# Patient Record
Sex: FEMALE | Race: WHITE | NOT HISPANIC OR LATINO | Employment: OTHER | URBAN - METROPOLITAN AREA
[De-identification: names, ages, dates, MRNs, and addresses within clinical notes are randomized per-mention and may not be internally consistent; named-entity substitution may affect disease eponyms.]

---

## 2017-12-28 ENCOUNTER — OFFICE VISIT (OUTPATIENT)
Dept: MEDICAL GROUP | Facility: PHYSICIAN GROUP | Age: 67
End: 2017-12-28
Payer: MEDICARE

## 2017-12-28 VITALS
TEMPERATURE: 97.1 F | WEIGHT: 186 LBS | SYSTOLIC BLOOD PRESSURE: 144 MMHG | RESPIRATION RATE: 16 BRPM | DIASTOLIC BLOOD PRESSURE: 80 MMHG | OXYGEN SATURATION: 91 % | HEART RATE: 65 BPM | HEIGHT: 60 IN | BODY MASS INDEX: 36.52 KG/M2

## 2017-12-28 DIAGNOSIS — E66.9 OBESITY (BMI 35.0-39.9 WITHOUT COMORBIDITY): ICD-10-CM

## 2017-12-28 DIAGNOSIS — Z78.0 POST-MENOPAUSAL: ICD-10-CM

## 2017-12-28 DIAGNOSIS — E66.9 OBESITY (BMI 30-39.9): ICD-10-CM

## 2017-12-28 DIAGNOSIS — E11.9 TYPE 2 DIABETES MELLITUS WITHOUT COMPLICATION, WITH LONG-TERM CURRENT USE OF INSULIN (HCC): ICD-10-CM

## 2017-12-28 DIAGNOSIS — Z79.4 TYPE 2 DIABETES MELLITUS WITHOUT COMPLICATION, WITH LONG-TERM CURRENT USE OF INSULIN (HCC): ICD-10-CM

## 2017-12-28 DIAGNOSIS — Z12.11 SCREENING FOR COLON CANCER: ICD-10-CM

## 2017-12-28 DIAGNOSIS — F43.10 PTSD (POST-TRAUMATIC STRESS DISORDER): ICD-10-CM

## 2017-12-28 DIAGNOSIS — I10 ESSENTIAL HYPERTENSION: ICD-10-CM

## 2017-12-28 DIAGNOSIS — Z23 NEED FOR VACCINATION WITH 13-POLYVALENT PNEUMOCOCCAL CONJUGATE VACCINE: ICD-10-CM

## 2017-12-28 PROCEDURE — 99204 OFFICE O/P NEW MOD 45 MIN: CPT | Mod: 25 | Performed by: INTERNAL MEDICINE

## 2017-12-28 PROCEDURE — 90670 PCV13 VACCINE IM: CPT | Performed by: INTERNAL MEDICINE

## 2017-12-28 PROCEDURE — G0009 ADMIN PNEUMOCOCCAL VACCINE: HCPCS | Performed by: INTERNAL MEDICINE

## 2017-12-28 RX ORDER — LISINOPRIL 20 MG/1
20 TABLET ORAL DAILY
Qty: 90 TAB | Refills: 3 | Status: SHIPPED | OUTPATIENT
Start: 2017-12-28 | End: 2019-01-03 | Stop reason: SDUPTHER

## 2017-12-28 RX ORDER — LANCETS 30 GAUGE
EACH MISCELLANEOUS
Qty: 100 EACH | Refills: 3 | Status: SHIPPED | OUTPATIENT
Start: 2017-12-28

## 2017-12-28 RX ORDER — CITALOPRAM 20 MG/1
20 TABLET ORAL DAILY
Qty: 90 TAB | Refills: 1 | Status: SHIPPED | OUTPATIENT
Start: 2017-12-28 | End: 2018-06-29 | Stop reason: SDUPTHER

## 2017-12-28 ASSESSMENT — PATIENT HEALTH QUESTIONNAIRE - PHQ9: CLINICAL INTERPRETATION OF PHQ2 SCORE: 0

## 2017-12-28 NOTE — PATIENT INSTRUCTIONS
· Referrals have been placed for you to see an eye doctor, diabetes education class, and for a colonoscopy  · Please have your fasting blood work done at your convenience  · Please  you diabetes meter and record at least your morning blood sugars in a log to bring to your next visit   · Your blood pressure medication has been switched from metoprolol to lisinopril

## 2017-12-28 NOTE — ASSESSMENT & PLAN NOTE
Was 200 lbs but started Herbalife 2 months ago (uses 2 shakes a day a a meal replacement) and is now down to 186. Has been using SlimFast now because she doesn't have Herbalife anymore. Hasn't been eating well or exercising much.

## 2017-12-28 NOTE — PROGRESS NOTES
PRIMARY CARE CLINIC NEW PATIENT H&P  Chief Complaint   Patient presents with   • Diabetes     History of Present Illness     Diabetes mellitus  Takes levemir 45 units every night. Lost her meter and doesn't measure her blood sugars. She said that she had an A1c done a few months ago and was told it was at goal but she doesn't have a report. No known complications of her diabetes but hasn't had her eyes checked for 3 years.     HTN (hypertension)  On metoprolol 25 mg bid but has been out for almost a week. Found 2 pills yesterday of which she took 1 yesterday and 1 today.     Depression  Has been on Celexa for the past 5 years and hasn't been able to tolerate not being on it.     Obesity (BMI 35.0-39.9 without comorbidity) (HCC)  Was 200 lbs but started Herbalife 2 months ago (uses 2 shakes a day a a meal replacement) and is now down to 186. Has been using SlimFast now because she doesn't have Herbalife anymore. Hasn't been eating well or exercising much.     Current Outpatient Prescriptions   Medication Sig Dispense Refill   • insulin detemir (LEVEMIR) 100 UNIT/ML Solution Inject 45 Units as instructed every bedtime. 10 mL 1   • citalopram (CELEXA) 20 MG Tab Take 1 Tab by mouth every day. 90 Tab 1   • lisinopril (PRINIVIL) 20 MG Tab Take 1 Tab by mouth every day. 90 Tab 3   • Blood Glucose Monitoring Suppl Device Meter: Dispense Device of Insurance Preference. Sig. Use as directed for blood sugar monitoring. #1. NR. 1 Device 0   • Lancets Misc Lancets order: Lancets for insurance provided/covered meter. Sig: use daily and prn ssx high or low sugar. #100 RF x 3 100 Each 3   • Blood Glucose Monitoring Suppl (BLOOD GLUCOSE MONITOR KIT) KIT 1 Each by Does not apply route. To check blood sugars QD  Dx:  250.00  BEN:  99 1 Each 0   • aspirin (ASA) 81 MG CHEW Take 1 Tab by mouth every day. 20 Each 1     No current facility-administered medications for this visit.        Past Medical History:   Diagnosis Date   • Depression     • DIABETES MELLITUS 10/6/2011   • GERD (gastroesophageal reflux disease)    • HTN (hypertension) 3/8/2013   • Hyperlipidemia    • Shingles outbreak 2016    right groin with periodic flares     Past Surgical History:   Procedure Laterality Date   • ANGIOPLASTY     • ABDOMINAL HYSTERECTOMY TOTAL      has cervix and ovaries   • HEMORRHOIDECTOMY     • HEMORRHOIDECTOMY       Social History   Substance Use Topics   • Smoking status: Never Smoker   • Smokeless tobacco: Never Used   • Alcohol use No     Social History     Social History Narrative    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 Her  and a son and daughter of hers committed suicide.           Family History   Problem Relation Age of Onset   • Breast Cancer Mother 50     metastatic   • Alcohol/Drug Father    • Other Father      cirrhosis    • Diabetes Sister    • Other Daughter      suicide    • Other Son      suicide     Family Status   Relation Status   • Mother  at age 65   • Father    • Sister Alive   • Daughter    • Son      Allergies: Codeine    ROS  Constitutional: Negative for fatigue/generalized weakness.   HEENT: Positive for vision changes, negative for hearing changes    Respiratory: Negative for shortness of breath  Cardiovascular: Negative for chest pain, palpitations  Gastrointestinal: Negative for blood in stool, constipation, diarrhea  Genitourinary: Negative for dysuria, polyuria  Musculoskeletal: Negative for myalgias, back pain, and joint pain.   Skin: Negative for rash  Neurological: Negative for numbness, tingling  Psychiatric/Behavioral: positive for stable well managed depression      Objective   Blood pressure 144/80, pulse 65, temperature 36.2 °C (97.1 °F), resp. rate 16, height 1.524 m (5'), weight 84.4 kg (186 lb), SpO2 91 %. Body mass index is 36.33 kg/m².    General: Alert, oriented. In no acute distress   HEET: EOMI, PERRL, conjunctiva non-injected, sclera non-icteric.  Nares  patent with no significant congestion or drainage.  Elena pinnae, external auditory canals, TM pearly gray with normal light reflex bilaterally.Oral mucous membranes pink and moist with no lesions.  Neck: supple with no cervical, subclavicular lymphadenopathy, JVD, palpable thyroid nodules   Lungs: clear to auscultation bilaterally with good excursion.  CV: regular rate and rhythm.  Abdomen soft, non-distended, non-tender with normal bowel sounds. No hepatosplenomegaly, no masses palpated  Skin: no lesions. Warm, dry   Psychiatric: appropriate mood and affect     Assessment and Plan   The following treatment plan was discussed     1. Need for vaccination with 13-polyvalent pneumococcal conjugate vaccine  Given today. Will give Pneumovax in a year.   - Pneumococcal Conjugate Vaccine 13-Valent <4yo IM    2. PTSD (post-traumatic stress disorder)  Stable on her current dose of Celexa, will continue.   - citalopram (CELEXA) 20 MG Tab; Take 1 Tab by mouth every day.  Dispense: 90 Tab; Refill: 1    3. Screening for colon cancer  Due for screening colonoscopy, ordered.   - REFERRAL TO GI FOR COLONOSCOPY    4. Obesity (BMI 30-39.9)  Counseled on increasing physical activity and improving diet.   - Patient identified as having weight management issue.  Appropriate orders and counseling given.    5. Essential hypertension  Currently not well controlled and has been on metoprolol 25 mg bid but ran out a week ago. Lisinopril is a better option for her especially given her diabetes, will switch and recheck how she's doing on this at 1 month follow up appointment.   - lisinopril (PRINIVIL) 20 MG Tab; Take 1 Tab by mouth every day.  Dispense: 90 Tab; Refill: 3    6. Type 2 diabetes mellitus without complication, with long-term current use of insulin (CMS-[facility])  Will check her fasting labs as well as HgbA1c to establish her baseline currently while on levemir 45u qHS. Will send to diabetes education (has never attended before),  continue to  on weight loss. Instructed her to resume checking fasting blood sugars, diabetes meter order and supplies sent to pharmacy. Also given referral to ophthalmologist to have her vision checked, last exam was 3 years ago. Will re-assess in 1 month.   - Diabetic Monofilament LE Exam  - insulin detemir (LEVEMIR) 100 UNIT/ML Solution; Inject 45 Units as instructed every bedtime.  Dispense: 10 mL; Refill: 1  - MICROALBUMIN CREAT RATIO URINE (CLINIC COLLECT); Future  - COMP METABOLIC PANEL; Future  - HEMOGLOBIN A1C; Future  - LIPID PROFILE; Future  - REFERRAL TO DIABETIC EDUCATION Diabetes Self Management Education / Training (DSME/T) and Medical Nutrition Therapy (MNT): Initial Group DSME/MNT as authorized by payor; DSME/T Content: Monitoring Diabetes, Nutritional Management  - REFERRAL TO OPHTHALMOLOGY  - lisinopril (PRINIVIL) 20 MG Tab; Take 1 Tab by mouth every day.  Dispense: 90 Tab; Refill: 3  - Blood Glucose Monitoring Suppl Device; Meter: Dispense Device of Insurance Preference. Sig. Use as directed for blood sugar monitoring. #1. NR.  Dispense: 1 Device; Refill: 0  - Lancets Misc; Lancets order: Lancets for insurance provided/covered meter. Sig: use daily and prn ssx high or low sugar. #100 RF x 3  Dispense: 100 Each; Refill: 3    7. Post-menopausal  Due for DEXA, was told she had osteopenia before. Counseled to start over the counter vitamin D and calcium supplementation.   - DS-BONE DENSITY STUDY (DEXA)    Return in about 1 month (around 1/28/2018).    Health Maintenance      Health Maintenance Due   Topic Date Due   • Annual Wellness Visit  1950   • RETINAL SCREENING  02/20/1968   • URINE ACR / MICROALBUMIN  02/20/1968   • MAMMOGRAM  02/20/1990   • COLONOSCOPY  02/20/2000   • IMM ZOSTER VACCINE  02/20/2010   • A1C SCREENING  03/24/2014   • FASTING LIPID PROFILE  09/24/2014   • SERUM CREATININE  09/24/2014   • BONE DENSITY  02/20/2015   • IMM PNEUMOCOCCAL 65+ (ADULT) LOW/MEDIUM RISK  SERIES (1 of 2 - PCV13) 02/20/2015     Juan Gross MD  Internal Medicine  [facility]

## 2017-12-28 NOTE — ASSESSMENT & PLAN NOTE
On metoprolol 25 mg bid but has been out for almost a week. Found 2 pills yesterday of which she took 1 yesterday and 1 today.

## 2017-12-28 NOTE — ASSESSMENT & PLAN NOTE
Takes levemir 45 units every night. Lost her meter and doesn't measure her blood sugars. She said that she had an A1c done a few months ago and was told it was at goal but she doesn't have a report. No known complications of her diabetes but hasn't had her eyes checked for 3 years.

## 2017-12-29 ENCOUNTER — TELEPHONE (OUTPATIENT)
Dept: MEDICAL GROUP | Facility: PHYSICIAN GROUP | Age: 67
End: 2017-12-29

## 2017-12-29 DIAGNOSIS — E11.9 TYPE 2 DIABETES MELLITUS WITHOUT COMPLICATION, WITH LONG-TERM CURRENT USE OF INSULIN (HCC): ICD-10-CM

## 2017-12-29 DIAGNOSIS — Z79.4 TYPE 2 DIABETES MELLITUS WITHOUT COMPLICATION, WITH LONG-TERM CURRENT USE OF INSULIN (HCC): ICD-10-CM

## 2017-12-29 NOTE — TELEPHONE ENCOUNTER
"1. Caller Name: [facility]                                         Call Back Number: [phone]      Patient approves a detailed voicemail message: N\A    Pharmacy received rx for glucometer with \"use as directed\"  They need order to be more specific in order to bill medicare.  "

## 2018-01-05 ENCOUNTER — HOSPITAL ENCOUNTER (OUTPATIENT)
Dept: LAB | Facility: MEDICAL CENTER | Age: 68
End: 2018-01-05
Attending: INTERNAL MEDICINE
Payer: MEDICARE

## 2018-01-05 DIAGNOSIS — E11.9 TYPE 2 DIABETES MELLITUS WITHOUT COMPLICATION, WITH LONG-TERM CURRENT USE OF INSULIN (HCC): ICD-10-CM

## 2018-01-05 DIAGNOSIS — E78.5 HYPERLIPIDEMIA, UNSPECIFIED HYPERLIPIDEMIA TYPE: ICD-10-CM

## 2018-01-05 DIAGNOSIS — Z79.4 TYPE 2 DIABETES MELLITUS WITHOUT COMPLICATION, WITH LONG-TERM CURRENT USE OF INSULIN (HCC): ICD-10-CM

## 2018-01-05 LAB
ALBUMIN SERPL BCP-MCNC: 3.8 G/DL (ref 3.2–4.9)
ALBUMIN/GLOB SERPL: 1 G/DL
ALP SERPL-CCNC: 76 U/L (ref 30–99)
ALT SERPL-CCNC: 16 U/L (ref 2–50)
ANION GAP SERPL CALC-SCNC: 8 MMOL/L (ref 0–11.9)
AST SERPL-CCNC: 15 U/L (ref 12–45)
BILIRUB SERPL-MCNC: 0.4 MG/DL (ref 0.1–1.5)
BUN SERPL-MCNC: 15 MG/DL (ref 8–22)
CALCIUM SERPL-MCNC: 9.4 MG/DL (ref 8.5–10.5)
CHLORIDE SERPL-SCNC: 104 MMOL/L (ref 96–112)
CHOLEST SERPL-MCNC: 263 MG/DL (ref 100–199)
CO2 SERPL-SCNC: 27 MMOL/L (ref 20–33)
CREAT SERPL-MCNC: 0.64 MG/DL (ref 0.5–1.4)
CREAT UR-MCNC: 98.7 MG/DL
EST. AVERAGE GLUCOSE BLD GHB EST-MCNC: 160 MG/DL
GFR SERPL CREATININE-BSD FRML MDRD: >60 ML/MIN/1.73 M 2
GLOBULIN SER CALC-MCNC: 3.8 G/DL (ref 1.9–3.5)
GLUCOSE SERPL-MCNC: 142 MG/DL (ref 65–99)
HBA1C MFR BLD: 7.2 % (ref 0–5.6)
HDLC SERPL-MCNC: 50 MG/DL
LDLC SERPL CALC-MCNC: 185 MG/DL
MICROALBUMIN UR-MCNC: <0.7 MG/DL
MICROALBUMIN/CREAT UR: NORMAL MG/G (ref 0–30)
POTASSIUM SERPL-SCNC: 4.2 MMOL/L (ref 3.6–5.5)
PROT SERPL-MCNC: 7.6 G/DL (ref 6–8.2)
SODIUM SERPL-SCNC: 139 MMOL/L (ref 135–145)
TRIGL SERPL-MCNC: 139 MG/DL (ref 0–149)

## 2018-01-05 PROCEDURE — 80053 COMPREHEN METABOLIC PANEL: CPT

## 2018-01-05 PROCEDURE — 36415 COLL VENOUS BLD VENIPUNCTURE: CPT

## 2018-01-05 PROCEDURE — 83036 HEMOGLOBIN GLYCOSYLATED A1C: CPT

## 2018-01-05 PROCEDURE — 80061 LIPID PANEL: CPT

## 2018-01-05 PROCEDURE — 82570 ASSAY OF URINE CREATININE: CPT

## 2018-01-05 PROCEDURE — 82043 UR ALBUMIN QUANTITATIVE: CPT

## 2018-01-05 RX ORDER — ATORVASTATIN CALCIUM 40 MG/1
40 TABLET, FILM COATED ORAL DAILY
Qty: 90 TAB | Refills: 3 | Status: SHIPPED | OUTPATIENT
Start: 2018-01-05 | End: 2018-03-28 | Stop reason: SINTOL

## 2018-01-11 ENCOUNTER — OFFICE VISIT (OUTPATIENT)
Dept: MEDICAL GROUP | Facility: PHYSICIAN GROUP | Age: 68
End: 2018-01-11
Payer: MEDICARE

## 2018-01-11 VITALS
TEMPERATURE: 98.6 F | HEIGHT: 60 IN | OXYGEN SATURATION: 93 % | DIASTOLIC BLOOD PRESSURE: 60 MMHG | SYSTOLIC BLOOD PRESSURE: 104 MMHG | WEIGHT: 186 LBS | RESPIRATION RATE: 16 BRPM | BODY MASS INDEX: 36.52 KG/M2 | HEART RATE: 67 BPM

## 2018-01-11 DIAGNOSIS — I10 ESSENTIAL HYPERTENSION: ICD-10-CM

## 2018-01-11 DIAGNOSIS — Z79.4 TYPE 2 DIABETES MELLITUS WITHOUT COMPLICATION, WITH LONG-TERM CURRENT USE OF INSULIN (HCC): ICD-10-CM

## 2018-01-11 DIAGNOSIS — E11.9 TYPE 2 DIABETES MELLITUS WITHOUT COMPLICATION, WITH LONG-TERM CURRENT USE OF INSULIN (HCC): ICD-10-CM

## 2018-01-11 DIAGNOSIS — R05.9 COUGH: ICD-10-CM

## 2018-01-11 LAB
FLUAV+FLUBV AG SPEC QL IA: NEGATIVE
INT CON NEG: NEGATIVE
INT CON POS: POSITIVE

## 2018-01-11 PROCEDURE — 99213 OFFICE O/P EST LOW 20 MIN: CPT | Performed by: INTERNAL MEDICINE

## 2018-01-11 PROCEDURE — 87804 INFLUENZA ASSAY W/OPTIC: CPT | Performed by: INTERNAL MEDICINE

## 2018-01-11 NOTE — ASSESSMENT & PLAN NOTE
She just now got her monitor in. Is having issues with covering the cost of insulin, which has been $100 a month.

## 2018-01-11 NOTE — ASSESSMENT & PLAN NOTE
Has been having a cough and sore throat for the past week. Also having post nasal drip. Feeling fatigue and having a lot of post nasal drip. Denies fevers, chills, nausea, vomiting. Having muscle aches and headaches. Drinking lots of water but not eating much. Has sick contacts at home.

## 2018-01-11 NOTE — PROGRESS NOTES
PRIMARY CARE CLINIC FOLLOW UP VISIT  Chief Complaint   Patient presents with   • Cough   • Medication Management     insulin      History of Present Illness     Cough  Has been having a cough and sore throat for the past week. Also having post nasal drip. Feeling fatigue and having a lot of post nasal drip. Denies fevers, chills, nausea, vomiting. Having muscle aches and headaches. Drinking lots of water but not eating much. Has sick contacts at home.     Diabetes mellitus (CMS-HCC)  She just now got her monitor in. Is having issues with covering the cost of insulin, which has been $100 a month.     HTN (hypertension)  Was switched from metoprolol to lisinopril at her last visit with me.     Current Outpatient Prescriptions   Medication Sig Dispense Refill   • atorvastatin (LIPITOR) 40 MG Tab Take 1 Tab by mouth every day. 90 Tab 3   • Blood Glucose Monitoring Suppl Device Meter: Dispense Device of Insurance Preference. Sig. Use every morning to obtain fasting blood sugars. #1. NR. 1 Device 0   • insulin detemir (LEVEMIR) 100 UNIT/ML Solution Inject 45 Units as instructed every bedtime. 10 mL 1   • citalopram (CELEXA) 20 MG Tab Take 1 Tab by mouth every day. 90 Tab 1   • lisinopril (PRINIVIL) 20 MG Tab Take 1 Tab by mouth every day. 90 Tab 3   • Blood Glucose Monitoring Suppl Device Meter: Dispense Device of Insurance Preference. Sig. Use as directed for blood sugar monitoring. #1. NR. 1 Device 0   • Lancets Misc Lancets order: Lancets for insurance provided/covered meter. Sig: use daily and prn ssx high or low sugar. #100 RF x 3 100 Each 3   • Blood Glucose Monitoring Suppl (BLOOD GLUCOSE MONITOR KIT) KIT 1 Each by Does not apply route. To check blood sugars QD  Dx:  250.00  BEN:  99 1 Each 0   • aspirin (ASA) 81 MG CHEW Take 1 Tab by mouth every day. 20 Each 1     No current facility-administered medications for this visit.      Past Medical History:   Diagnosis Date   • Depression    • DIABETES MELLITUS 10/6/2011    • GERD (gastroesophageal reflux disease)    • HTN (hypertension) 3/8/2013   • Hyperlipidemia    • Shingles outbreak 2016    right groin with periodic flares     Past Surgical History:   Procedure Laterality Date   • ANGIOPLASTY     • ABDOMINAL HYSTERECTOMY TOTAL      has cervix and ovaries   • HEMORRHOIDECTOMY     • HEMORRHOIDECTOMY       Social History   Substance Use Topics   • Smoking status: Never Smoker   • Smokeless tobacco: Never Used   • Alcohol use No     Social History     Social History Narrative    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 Her  and a son and daughter of hers committed suicide.           Family History   Problem Relation Age of Onset   • Breast Cancer Mother 50     metastatic   • Alcohol/Drug Father    • Other Father      cirrhosis    • Diabetes Sister    • Other Daughter      suicide    • Other Son      suicide     Family Status   Relation Status   • Mother  at age 65   • Father    • Sister Alive   • Daughter    • Son      Allergies: Codeine    ROS  As per HPI above. All other systems reviewed and negative.        Objective   Blood pressure 104/60, pulse 67, temperature 37 °C (98.6 °F), resp. rate 16, height 1.524 m (5'), weight 84.4 kg (186 lb), SpO2 93 %. Body mass index is 36.33 kg/m².    General: alert and oriented, pleasant, cooperative  HEENT: Normocephalic, atraumatic. No thyroid masses. Oropharynx clear without exudate or injection.   Cardiovascular: regular rate and rhythm, normal S1/S2  Pulmonary: lungs clear to auscultation bilaterally  Lymphatics: no cervical or supraclavicular lymphadenopathy   Skin: warm and dry, no lesions or rashes  Psychiatric: appropriate mood and affect. Good insight and appropriate judgment     Assessment and Plan   The following treatment plan was discussed     1. Cough  Most likely viral URI, POCT flu negative. Counseled on hydration, over the counter nasal steroid spray to help with post nasal  drip, and mucinex.     2. Type 2 diabetes mellitus without complication, with long-term current use of insulin (CMS-[facility])  Her most recent hemoglobin A1c was at goal on levemir 45 units nightly but she's having issues with cost coverage for this. Referred to complex care management for help and also contacted Amna Mckenzie from [facility] to assist with alternatives.   - REFERRAL TO COMPLEX CARE MANAGEMENT Services Requested: Care Manager to Evaluate and Recommend    3. Essential hypertension  Currently at goal, blood pressure today 106/40 on lisinopril 20 mg daily. This was switched from metoprolol when she established care with me last month. Continue lisinopril 20 mg daily.       Healthcare maintenance     Health Maintenance Due   Topic Date Due   • Annual Wellness Visit  1950   • RETINAL SCREENING  02/20/1968   • COLONOSCOPY  02/20/2000   • IMM ZOSTER VACCINE  02/20/2010   • BONE DENSITY  02/20/2015       Return in about 1 month (around 2/11/2018) for AWV.    Juan Gross MD  Internal Medicine  [facility]

## 2018-01-15 ENCOUNTER — TELEPHONE (OUTPATIENT)
Dept: MEDICAL GROUP | Facility: PHYSICIAN GROUP | Age: 68
End: 2018-01-15

## 2018-01-15 DIAGNOSIS — E11.9 TYPE 2 DIABETES MELLITUS WITHOUT COMPLICATION, WITH LONG-TERM CURRENT USE OF INSULIN (HCC): ICD-10-CM

## 2018-01-15 DIAGNOSIS — Z79.4 TYPE 2 DIABETES MELLITUS WITHOUT COMPLICATION, WITH LONG-TERM CURRENT USE OF INSULIN (HCC): ICD-10-CM

## 2018-01-15 RX ORDER — INSULIN GLARGINE 100 [IU]/ML
45 INJECTION, SOLUTION SUBCUTANEOUS
Qty: 10 ML | Refills: 3 | Status: SHIPPED | OUTPATIENT
Start: 2018-01-15 | End: 2018-05-09 | Stop reason: SDUPTHER

## 2018-01-15 NOTE — TELEPHONE ENCOUNTER
Please call patient and let her know that her insulin detemir is not covered by [facility] but lantus is. Her new prescription has been sent to her pharmacy.

## 2018-01-19 ENCOUNTER — TELEPHONE (OUTPATIENT)
Dept: MEDICAL GROUP | Facility: PHYSICIAN GROUP | Age: 68
End: 2018-01-19

## 2018-01-19 DIAGNOSIS — E11.9 TYPE 2 DIABETES MELLITUS WITHOUT COMPLICATION, WITH LONG-TERM CURRENT USE OF INSULIN (HCC): ICD-10-CM

## 2018-01-19 DIAGNOSIS — Z79.4 TYPE 2 DIABETES MELLITUS WITHOUT COMPLICATION, WITH LONG-TERM CURRENT USE OF INSULIN (HCC): ICD-10-CM

## 2018-01-22 DIAGNOSIS — E11.9 TYPE 2 DIABETES MELLITUS WITHOUT COMPLICATION, WITH LONG-TERM CURRENT USE OF INSULIN (HCC): ICD-10-CM

## 2018-01-22 DIAGNOSIS — Z79.4 TYPE 2 DIABETES MELLITUS WITHOUT COMPLICATION, WITH LONG-TERM CURRENT USE OF INSULIN (HCC): ICD-10-CM

## 2018-01-30 ENCOUNTER — PATIENT OUTREACH (OUTPATIENT)
Dept: HEALTH INFORMATION MANAGEMENT | Facility: OTHER | Age: 68
End: 2018-01-30

## 2018-01-30 NOTE — PROGRESS NOTES
Referral received from pt's provider Dr. Gross indicating pt may benefit from some resources to assist in insulin and diabetic supply cost. Initial outreach call scheduled on this date. Outbound call to pt to discuss above referral. No answer, LVM with contact information requesting TC back.     Plan:  Will attempt to reach pt for 2nd attempt on 02/01/2018 at 09:00am, unless LSW hears from pt sooner.

## 2018-02-01 ENCOUNTER — PATIENT OUTREACH (OUTPATIENT)
Dept: HEALTH INFORMATION MANAGEMENT | Facility: OTHER | Age: 68
End: 2018-02-01

## 2018-02-01 ENCOUNTER — PATIENT MESSAGE (OUTPATIENT)
Dept: HEALTH INFORMATION MANAGEMENT | Facility: OTHER | Age: 68
End: 2018-02-01

## 2018-02-01 NOTE — PROGRESS NOTES
Initial outreach 2nd attempt scheduled on this date to discuss referral received by pt's provider Dr. Gross. Referral indicates pt may be having a difficult time affording her insulin. Outbound call to pt to discuss referral and pt's identified needs. No answer, LVM with contact information requesting TC back.     Plan:  LSW to send pt [employer] message with contact information and close referral, due to not being able to reach pt.

## 2018-02-07 ENCOUNTER — APPOINTMENT (OUTPATIENT)
Dept: RADIOLOGY | Facility: MEDICAL CENTER | Age: 68
End: 2018-02-07
Attending: INTERNAL MEDICINE
Payer: MEDICARE

## 2018-03-27 ENCOUNTER — TELEPHONE (OUTPATIENT)
Dept: MEDICAL GROUP | Facility: PHYSICIAN GROUP | Age: 68
End: 2018-03-27

## 2018-03-27 NOTE — TELEPHONE ENCOUNTER
ANNUAL WELLNESS VISIT PRE-VISIT PLANNING WITHOUT OUTREACH    1.  Reviewed note from last office visit with PCP: YES    2.  If any orders were placed at last visit, do we have Results/Consult Notes?        •  Labs - Labs ordered, completed on 1/11/18 and results are in chart.  Note: If patient appointment is for lab review and patient did not complete labs, check with provider if OK to reschedule patient until labs completed.       •  Imaging - Imaging was not ordered at last office visit.       •  Referrals - Referral ordered, patient has NOT been seen.    3.  Immunizations were updated in [employer] using Web[employer]?: Yes       •  WebIZ Recommendations: ZOSTAVAX (Shingles)        •  Is patient due for Tdap? NO       •  Is patient due for Shingles? YES. Patient was notified of copay/out of pocket cost.     4.  Patient is due for the following Health Maintenance Topics:   Health Maintenance Due   Topic Date Due   • Annual Wellness Visit  1950   • RETINAL SCREENING  02/20/1968   • COLONOSCOPY  02/20/2000   • IMM ZOSTER VACCINE  02/20/2010   • BONE DENSITY  02/20/2015       - Patient is up-to-date on all Health Maintenance topics. No records have been requested at this time.    5.  Reviewed/Updated the following with patient:       •   Preferred Pharmacy? YES       •   Preferred Lab? YES       •   Preferred Communication? YES       •   Allergies? YES       •   Medications? NO       •   Social History? NO       •   Family History (document living status of immediate family members and if + hx of  cancer, diabetes, hypertension, hyperlipidemia, heart attack, stroke) NO    6.  Care Team Updated:       •   DME Company (gait device, O2, CPAP, etc.): YES       •   Other Specialists (eye doctor, derm, GYN, cardiology, endo, etc): YES    7.  Patient has the following Care Path diagnoses on Problem List:  NONE    8.  MDX printed and highlighted for Provider? YES    9.  Patient was advised: “This is a free wellness visit. The  provider will screen for medical conditions to help you stay healthy. If you have other concerns to address you may be asked to discuss these at a separate visit or there may be an additional fee.”     10.  Patient was NOT informed to arrive 15 min prior to their scheduled appointment and bring in their medication bottles.

## 2018-03-28 ENCOUNTER — HOSPITAL ENCOUNTER (OUTPATIENT)
Dept: LAB | Facility: MEDICAL CENTER | Age: 68
End: 2018-03-28
Attending: INTERNAL MEDICINE
Payer: MEDICARE

## 2018-03-28 ENCOUNTER — OFFICE VISIT (OUTPATIENT)
Dept: MEDICAL GROUP | Facility: PHYSICIAN GROUP | Age: 68
End: 2018-03-28
Payer: MEDICARE

## 2018-03-28 VITALS
OXYGEN SATURATION: 92 % | DIASTOLIC BLOOD PRESSURE: 66 MMHG | RESPIRATION RATE: 16 BRPM | TEMPERATURE: 98.4 F | WEIGHT: 189 LBS | HEART RATE: 76 BPM | BODY MASS INDEX: 37.11 KG/M2 | SYSTOLIC BLOOD PRESSURE: 138 MMHG | HEIGHT: 60 IN

## 2018-03-28 DIAGNOSIS — Z79.4 TYPE 2 DIABETES MELLITUS WITHOUT COMPLICATION, WITH LONG-TERM CURRENT USE OF INSULIN (HCC): ICD-10-CM

## 2018-03-28 DIAGNOSIS — R21 RASH: ICD-10-CM

## 2018-03-28 DIAGNOSIS — L60.9 FINGERNAIL ABNORMALITIES: ICD-10-CM

## 2018-03-28 DIAGNOSIS — Z77.012 EXPOSURE TO URANIUM: ICD-10-CM

## 2018-03-28 DIAGNOSIS — E11.9 TYPE 2 DIABETES MELLITUS WITHOUT COMPLICATION, WITH LONG-TERM CURRENT USE OF INSULIN (HCC): ICD-10-CM

## 2018-03-28 LAB — TSH SERPL DL<=0.005 MIU/L-ACNC: 1.93 UIU/ML (ref 0.38–5.33)

## 2018-03-28 PROCEDURE — 84443 ASSAY THYROID STIM HORMONE: CPT

## 2018-03-28 PROCEDURE — 36415 COLL VENOUS BLD VENIPUNCTURE: CPT

## 2018-03-28 PROCEDURE — 99213 OFFICE O/P EST LOW 20 MIN: CPT | Performed by: INTERNAL MEDICINE

## 2018-03-28 PROCEDURE — 92250 FUNDUS PHOTOGRAPHY W/I&R: CPT | Mod: TC | Performed by: INTERNAL MEDICINE

## 2018-03-28 RX ORDER — BLOOD-GLUCOSE METER
KIT MISCELLANEOUS
COMMUNITY
Start: 2018-01-23

## 2018-03-28 RX ORDER — PRAVASTATIN SODIUM 20 MG
20 TABLET ORAL DAILY
Qty: 90 TAB | Refills: 1 | Status: SHIPPED | OUTPATIENT
Start: 2018-03-28 | End: 2019-02-08 | Stop reason: SDUPTHER

## 2018-03-28 ASSESSMENT — PAIN SCALES - GENERAL: PAINLEVEL: NO PAIN

## 2018-03-28 NOTE — ASSESSMENT & PLAN NOTE
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

## 2018-03-28 NOTE — PROGRESS NOTES
PRIMARY CARE CLINIC FOLLOW UP VISIT  Chief Complaint   Patient presents with   • Medication Reaction     Atorvastatin; x 5 weeks ago;      History of Present Illness     Rash  Started having redness of both hands since starting atorvastatin 40 mg about 5 weeks ago. She has tried eczema cream without relief. She had then stopped atorvastatin which helped resolve her symptoms but then the rash recurred when she resumed atorvastatin.     Diabetes mellitus (CMS-HCC)  Was due for eye exam but her doctor went out of town and they had to reschedule.     Fingernail abnormalities  Had 20 lbs fall onto her right toe that is abnormal since then. Her toenail has thickened and is difficult for her to trim.     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       Current Outpatient Prescriptions   Medication Sig Dispense Refill   • pravastatin (PRAVACHOL) 20 MG Tab Take 1 Tab by mouth every day. 90 Tab 1   • Blood Glucose Monitoring Suppl Supplies Misc Test strip and meter order: Test strips for [employer] or whichever brand is covered by ins. Sig: use daily and prn ssx high or low sugar 100 Units 3   • insulin glargine (LANTUS) 100 UNIT/ML Solution Inject 45 Units as instructed every bedtime. 10 mL 3   • lisinopril (PRINIVIL) 20 MG Tab Take 1 Tab by mouth every day. 90 Tab 3   • FREESTYLE LITE strip      • Blood Glucose Monitoring Suppl Device Meter: Dispense Device of Insurance Preference. Sig. Use every morning to obtain fasting blood sugars. #1. NR. 1 Device 0   • citalopram (CELEXA) 20 MG Tab Take 1 Tab by mouth every day. 90 Tab 1   • Blood Glucose Monitoring Suppl Device Meter: Dispense Device of Insurance Preference. Sig. Use as directed for blood sugar monitoring. #1. NR. 1 Device 0   • Lancets Misc Lancets order: Lancets for insurance provided/covered meter. Sig: use daily and prn ssx high or low sugar. #100 RF x 3 100 Each 3   • Blood Glucose  Monitoring Suppl (BLOOD GLUCOSE MONITOR KIT) KIT 1 Each by Does not apply route. To check blood sugars QD  Dx:  250.00  BEN:  99 1 Each 0   • aspirin (ASA) 81 MG CHEW Take 1 Tab by mouth every day. 20 Each 1     No current facility-administered medications for this visit.      Past Medical History:   Diagnosis Date   • Depression    • DIABETES MELLITUS 10/6/2011   • GERD (gastroesophageal reflux disease)    • HTN (hypertension) 3/8/2013   • Hyperlipidemia    • Shingles outbreak 2016    right groin with periodic flares     Past Surgical History:   Procedure Laterality Date   • ANGIOPLASTY     • ABDOMINAL HYSTERECTOMY TOTAL      has cervix and ovaries   • HEMORRHOIDECTOMY     • HEMORRHOIDECTOMY       Social History   Substance Use Topics   • Smoking status: Never Smoker   • Smokeless tobacco: Never Used   • Alcohol use No     Social History     Social History Narrative    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 Her  and a son and daughter of hers committed suicide.           Family History   Problem Relation Age of Onset   • Breast Cancer Mother 50     metastatic   • Alcohol/Drug Father    • Other Father      cirrhosis    • Diabetes Sister    • Other Daughter      suicide    • Other Son      suicide     Family Status   Relation Status   • Mother  at age 65   • Father    • Sister Alive   • Daughter    • Son      Allergies: Atorvastatin and Codeine    ROS  As per HPI above. All other systems reviewed and negative.        Objective   Blood pressure 138/66, pulse 76, temperature 36.9 °C (98.4 °F), resp. rate 16, height 1.524 m (5'), weight 85.7 kg (189 lb), SpO2 92 %, not currently breastfeeding. Body mass index is 36.91 kg/m².    General: alert and oriented, pleasant, cooperative  HEENT: Normocephalic, atraumatic.   Lymphatics: no cervical or supraclavicular lymphadenopathy   Skin: erythema of dorsal aspects of bilateral hands with diffuse excoriations.  Thickness/gray color changes of right great toenail   Psychiatric: appropriate mood and affect. Good insight and appropriate judgment     Assessment and Plan   The following treatment plan was discussed     1. Rash  Will stop atorvastatin 40 mg daily and will switch to pravastatin 20 mg daily. Told her that she may wait 2-3 weeks before starting the pravastatin.   - pravastatin (PRAVACHOL) 20 MG Tab; Take 1 Tab by mouth every day.  Dispense: 90 Tab; Refill: 1    2. Fingernail abnormalities  Recommended vinegar/water soaks. If no improvement will refer to podiatry.     3. Type 2 diabetes mellitus without complication, with long-term current use of insulin (CMS-[facility])  POCT retinal eye exam in office today.   - POCT Retinal Eye Exam    4. Exposure to uranium  - TSH WITH REFLEX TO FT4; Future      Healthcare maintenance     Health Maintenance Due   Topic Date Due   • Annual Wellness Visit  1950   • RETINAL SCREENING  02/20/1968   • COLONOSCOPY  02/20/2000   • IMM ZOSTER VACCINE  02/20/2010   • BONE DENSITY  02/20/2015       Return in about 9 days (around 4/6/2018) for annual wellness visit .    Juan Gross MD  Internal Medicine  [facility]

## 2018-03-28 NOTE — ASSESSMENT & PLAN NOTE
Started having redness of both hands since starting atorvastatin 40 mg about 5 weeks ago. She has tried eczema cream without relief. She had then stopped atorvastatin which helped resolve her symptoms but then the rash recurred when she resumed atorvastatin.

## 2018-03-28 NOTE — ASSESSMENT & PLAN NOTE
Had 20 lbs fall onto her right toe that is abnormal since then. Her toenail has thickened and is difficult for her to trim.

## 2018-03-28 NOTE — PATIENT INSTRUCTIONS
· Please provide us with records of your most recent colonoscopy   · Try vinegar soaks for your right toenail  · Please check that it is insulin lantus that you are taking every night

## 2018-04-10 LAB — RETINAL SCREEN: NEGATIVE

## 2018-04-11 ENCOUNTER — TELEPHONE (OUTPATIENT)
Dept: MEDICAL GROUP | Facility: PHYSICIAN GROUP | Age: 68
End: 2018-04-11

## 2018-04-11 DIAGNOSIS — L60.9 FINGERNAIL ABNORMALITIES: ICD-10-CM

## 2018-04-11 NOTE — TELEPHONE ENCOUNTER
Pt was seen 03/28/18 because a 20 lb weight had fallen on her toe and toenail has become thicker.  She states she was told to soak in vinegar but has not helped.  She is diabetic and has been told not to trim her toenails herself.  She is asking to see a podiatrist to toenails trimmed.

## 2018-04-11 NOTE — TELEPHONE ENCOUNTER
1. Caller Name: Diamante                                       Call Back Number: [phone] (home)         Patient approves a detailed voicemail message: N\A    2. SPECIFIC Action To Be Taken: Referral pending, please sign.  Not sure what the diagnosis would be    3. Diagnosis/Clinical Reason for Request: unknown    4. Specialty & Provider Name/Lab/Imaging Location: podiatry    5. Is appointment scheduled for requested order/referral: no    Patient informed they will receive a return phone call from the office ONLY if there are any questions before processing their request. Advised to call back if they haven't received a call from the referral department in 5 days.

## 2018-05-09 DIAGNOSIS — Z79.4 TYPE 2 DIABETES MELLITUS WITHOUT COMPLICATION, WITH LONG-TERM CURRENT USE OF INSULIN (HCC): ICD-10-CM

## 2018-05-09 DIAGNOSIS — E11.9 TYPE 2 DIABETES MELLITUS WITHOUT COMPLICATION, WITH LONG-TERM CURRENT USE OF INSULIN (HCC): ICD-10-CM

## 2018-05-09 RX ORDER — INSULIN GLARGINE 100 [IU]/ML
INJECTION, SOLUTION SUBCUTANEOUS
Qty: 40 ML | Refills: 0 | Status: SHIPPED | OUTPATIENT
Start: 2018-05-09 | End: 2018-08-02 | Stop reason: SDUPTHER

## 2018-06-29 DIAGNOSIS — F43.10 PTSD (POST-TRAUMATIC STRESS DISORDER): ICD-10-CM

## 2018-07-02 RX ORDER — CITALOPRAM 20 MG/1
TABLET ORAL
Qty: 90 TAB | Refills: 1 | Status: SHIPPED | OUTPATIENT
Start: 2018-07-02 | End: 2019-01-10 | Stop reason: SDUPTHER

## 2018-07-25 ENCOUNTER — PATIENT OUTREACH (OUTPATIENT)
Dept: HEALTH INFORMATION MANAGEMENT | Facility: OTHER | Age: 68
End: 2018-07-25

## 2018-07-25 NOTE — PROGRESS NOTES
Outcome: Left Message    Please transfer to Patient Outreach Team at [phone] when patient returns call.    Attempt # 1  Pre visit planning

## 2018-07-30 ENCOUNTER — TELEPHONE (OUTPATIENT)
Dept: MEDICAL GROUP | Facility: PHYSICIAN GROUP | Age: 68
End: 2018-07-30

## 2018-07-30 NOTE — TELEPHONE ENCOUNTER
Future Appointments       Provider Department Center    8/8/2018 8:35 AM Juan Gross M.D. [facility]    8/9/2018 2:15 PM Juan Gross M.D.; [facility]  [facility]        ANNUAL WELLNESS VISIT PRE-VISIT PLANNING WITHOUT OUTREACH    1.  Reviewed note from last office visit with PCP: YES    2.  If any orders were placed at last visit, do we have Results/Consult Notes?        •  Labs - Labs ordered, completed on 03/28/18 and results are in chart.       •  Imaging - Imaging was not ordered at last office visit.       •  Referrals - Referral ordered, patient has NOT been seen.    3.  Immunizations were updated in [employer] using WebIZ?: Yes       •  WebIZ Recommendations: FLU, TD and SHINGRIX (Shingles)        •  Is patient due for Tdap? NO       •  Is patient due for Shingles? YES. Patient was not notified of copay/out of pocket cost.     4.  Patient is due for the following Health Maintenance Topics:   Health Maintenance Due   Topic Date Due   • Annual Wellness Visit  1950   • COLONOSCOPY  02/20/2000   • IMM ZOSTER VACCINES (1 of 2) 02/20/2000   • BONE DENSITY  02/20/2015   • A1C SCREENING  07/05/2018       5.  Reviewed/Updated the following with patient:       •   Preferred Pharmacy? NO       •   Preferred Lab? NO       •   Preferred Communication? NO       •   Allergies? NO       •   Medications? NO       •   Social History? NO       •   Family History (document living status of immediate family members and if + hx of  cancer, diabetes, hypertension, hyperlipidemia, heart attack, stroke) NO    6.  Care Team Updated:       •   DME Company (gait device, O2, CPAP, etc.): NO       •   Other Specialists (eye doctor, derm, GYN, cardiology, endo, etc): NO    7.  Patient has the following Care Path diagnoses on Problem List:  DIABETES  DEPRESSION       8.  MDX printed and highlighted for Provider? NO  Complete and complaint on 03/28/18    9.  Patient was  not advised: “This is a free wellness visit. The provider will screen for medical conditions to help you stay healthy. If you have other concerns to address you may be asked to discuss these at a separate visit or there may be an additional fee.”     10.  Patient was informed to arrive 15 min prior to their scheduled appointment and bring in their medication bottles.LVMTCB after three attempts I was unable to get in contact with Pt.

## 2018-08-02 DIAGNOSIS — Z79.4 TYPE 2 DIABETES MELLITUS WITHOUT COMPLICATION, WITH LONG-TERM CURRENT USE OF INSULIN (HCC): ICD-10-CM

## 2018-08-02 DIAGNOSIS — E11.9 TYPE 2 DIABETES MELLITUS WITHOUT COMPLICATION, WITH LONG-TERM CURRENT USE OF INSULIN (HCC): ICD-10-CM

## 2018-08-03 NOTE — TELEPHONE ENCOUNTER
Was the patient seen in the last year in this department? Yes    Does patient have an active prescription for medications requested? No     Received Request Via: Pharmacy      Pt met protocol?: Yes    LAST OV 03/28/2018      Lab Results  Component Value Date/Time   HBA1C 7.2 (H) 01/05/2018 0742       Lab Results  Component Value Date/Time   AVGLUC 160 01/05/2018 0742       Lab Results  Component Value Date/Time   CHOLSTRLTOT 263 (H) 01/05/2018 0742       Lab Results  Component Value Date/Time   TRIGLYCERIDE 139 01/05/2018 0742       Lab Results  Component Value Date/Time   HDL 50 01/05/2018 0742       Lab Results  Component Value Date/Time    (H) 01/05/2018 0742

## 2018-08-04 RX ORDER — INSULIN GLARGINE 100 [IU]/ML
INJECTION, SOLUTION SUBCUTANEOUS
Qty: 40 ML | Refills: 0 | Status: SHIPPED | OUTPATIENT
Start: 2018-08-04 | End: 2018-11-05 | Stop reason: SDUPTHER

## 2018-12-06 DIAGNOSIS — E11.9 TYPE 2 DIABETES MELLITUS WITHOUT COMPLICATION, WITH LONG-TERM CURRENT USE OF INSULIN (HCC): ICD-10-CM

## 2018-12-06 DIAGNOSIS — Z79.4 TYPE 2 DIABETES MELLITUS WITHOUT COMPLICATION, WITH LONG-TERM CURRENT USE OF INSULIN (HCC): ICD-10-CM

## 2018-12-07 RX ORDER — INSULIN GLARGINE 100 [IU]/ML
INJECTION, SOLUTION SUBCUTANEOUS
Qty: 40 ML | Refills: 0 | Status: SHIPPED | OUTPATIENT
Start: 2018-12-07 | End: 2019-01-10 | Stop reason: SDUPTHER

## 2018-12-08 NOTE — TELEPHONE ENCOUNTER
Last seen by PCP 3/18. Will send 3 months to pharmacy. Patient is due for an appointment. Please schedule.

## 2019-01-02 DIAGNOSIS — I10 ESSENTIAL HYPERTENSION: ICD-10-CM

## 2019-01-02 DIAGNOSIS — Z79.4 TYPE 2 DIABETES MELLITUS WITHOUT COMPLICATION, WITH LONG-TERM CURRENT USE OF INSULIN (HCC): ICD-10-CM

## 2019-01-02 DIAGNOSIS — E11.9 TYPE 2 DIABETES MELLITUS WITHOUT COMPLICATION, WITH LONG-TERM CURRENT USE OF INSULIN (HCC): ICD-10-CM

## 2019-01-03 DIAGNOSIS — E11.9 TYPE 2 DIABETES MELLITUS WITHOUT COMPLICATION, WITH LONG-TERM CURRENT USE OF INSULIN (HCC): ICD-10-CM

## 2019-01-03 DIAGNOSIS — I10 ESSENTIAL HYPERTENSION: ICD-10-CM

## 2019-01-03 DIAGNOSIS — Z79.4 TYPE 2 DIABETES MELLITUS WITHOUT COMPLICATION, WITH LONG-TERM CURRENT USE OF INSULIN (HCC): ICD-10-CM

## 2019-01-04 RX ORDER — LISINOPRIL 20 MG/1
20 TABLET ORAL DAILY
Qty: 90 TAB | Refills: 0 | OUTPATIENT
Start: 2019-01-04

## 2019-01-04 RX ORDER — LISINOPRIL 20 MG/1
TABLET ORAL
Qty: 90 TAB | Refills: 0 | Status: SHIPPED | OUTPATIENT
Start: 2019-01-04 | End: 2019-04-03 | Stop reason: SDUPTHER

## 2019-01-08 ENCOUNTER — TELEPHONE (OUTPATIENT)
Dept: MEDICAL GROUP | Facility: PHYSICIAN GROUP | Age: 69
End: 2019-01-08

## 2019-01-09 NOTE — TELEPHONE ENCOUNTER
Future Appointments       Provider Department Center    1/10/2019 2:55 PM Juan Gross M.D. [facility]        ESTABLISHED PATIENT PRE-VISIT PLANNING     Patient was NOT contacted to complete PVP.       1.  Reviewed notes from the last few office visits within the medical group: Yes    2.  If any orders were placed at last visit or intended to be done for this visit (i.e. 6 mos follow-up), do we have Results/Consult Notes?        •  Labs - Labs ordered, completed on 03/28/2018 and results are in chart.        •  Imaging - Imaging was not ordered at last office visit.       •  Referrals - No referrals were ordered at last office visit.    3. Is this appointment scheduled as a Hospital Follow-Up? No    4.  Immunizations were updated in [employer] using WebIZ?: Yes       •  Web Iz Recommendations: FLU, PNEUMOVAX (PPSV23), TD and SHINGRIX (Shingles)    5.  Patient is due for the following Health Maintenance Topics:   Health Maintenance Due   Topic Date Due   • Annual Wellness Visit  1950   • IMM HEP B VACCINE (1 of 3 - Risk 3-dose series) 02/20/1969   • COLONOSCOPY  02/20/2000   • IMM ZOSTER VACCINES (1 of 2) 02/20/2000   • BONE DENSITY  02/20/2015   • A1C SCREENING  07/05/2018   • IMM INFLUENZA (1) 09/01/2018   • MAMMOGRAM  09/12/2018   • IMM PNEUMOCOCCAL 65+ (ADULT) LOW/MEDIUM RISK SERIES (2 of 2 - PPSV23) 12/28/2018   • DIABETES MONOFILAMENT / LE EXAM  12/28/2018   • FASTING LIPID PROFILE  01/05/2019   • URINE ACR / MICROALBUMIN  01/05/2019   • SERUM CREATININE  01/05/2019     6. Orders for overdue Health Maintenance topics pended in Pre-Charting? YES    7.  AHA (MDX) form printed for Provider? YES    8.  Patient was NOT informed to arrive 15 min prior to their scheduled appointment and bring in their medication bottles.

## 2019-01-10 ENCOUNTER — OFFICE VISIT (OUTPATIENT)
Dept: MEDICAL GROUP | Facility: PHYSICIAN GROUP | Age: 69
End: 2019-01-10
Payer: MEDICARE

## 2019-01-10 VITALS
HEART RATE: 66 BPM | WEIGHT: 191.2 LBS | BODY MASS INDEX: 37.54 KG/M2 | HEIGHT: 60 IN | SYSTOLIC BLOOD PRESSURE: 124 MMHG | TEMPERATURE: 98.1 F | RESPIRATION RATE: 12 BRPM | OXYGEN SATURATION: 98 % | DIASTOLIC BLOOD PRESSURE: 68 MMHG

## 2019-01-10 DIAGNOSIS — Z78.0 POSTMENOPAUSAL: ICD-10-CM

## 2019-01-10 DIAGNOSIS — Z20.1 EXPOSURE TO TB: ICD-10-CM

## 2019-01-10 DIAGNOSIS — Z79.4 TYPE 2 DIABETES MELLITUS WITHOUT COMPLICATION, WITH LONG-TERM CURRENT USE OF INSULIN (HCC): ICD-10-CM

## 2019-01-10 DIAGNOSIS — Z12.11 COLON CANCER SCREENING: ICD-10-CM

## 2019-01-10 DIAGNOSIS — R05.9 COUGH: ICD-10-CM

## 2019-01-10 DIAGNOSIS — Z23 NEED FOR VACCINATION: ICD-10-CM

## 2019-01-10 DIAGNOSIS — K43.9 HERNIA OF ABDOMINAL WALL: ICD-10-CM

## 2019-01-10 DIAGNOSIS — F43.10 PTSD (POST-TRAUMATIC STRESS DISORDER): ICD-10-CM

## 2019-01-10 DIAGNOSIS — E11.9 TYPE 2 DIABETES MELLITUS WITHOUT COMPLICATION, WITH LONG-TERM CURRENT USE OF INSULIN (HCC): ICD-10-CM

## 2019-01-10 DIAGNOSIS — Z12.31 ENCOUNTER FOR SCREENING MAMMOGRAM FOR BREAST CANCER: ICD-10-CM

## 2019-01-10 PROCEDURE — 99214 OFFICE O/P EST MOD 30 MIN: CPT | Mod: 25 | Performed by: INTERNAL MEDICINE

## 2019-01-10 PROCEDURE — 8041 PR SCP AHA: Performed by: INTERNAL MEDICINE

## 2019-01-10 PROCEDURE — 90732 PPSV23 VACC 2 YRS+ SUBQ/IM: CPT | Performed by: INTERNAL MEDICINE

## 2019-01-10 PROCEDURE — G0009 ADMIN PNEUMOCOCCAL VACCINE: HCPCS | Performed by: INTERNAL MEDICINE

## 2019-01-10 PROCEDURE — G0010 ADMIN HEPATITIS B VACCINE: HCPCS | Performed by: INTERNAL MEDICINE

## 2019-01-10 PROCEDURE — 90746 HEPB VACCINE 3 DOSE ADULT IM: CPT | Performed by: INTERNAL MEDICINE

## 2019-01-10 PROCEDURE — 86580 TB INTRADERMAL TEST: CPT | Performed by: INTERNAL MEDICINE

## 2019-01-10 RX ORDER — INSULIN GLARGINE 100 [IU]/ML
INJECTION, SOLUTION SUBCUTANEOUS
Qty: 15 ML | Refills: 2 | Status: SHIPPED | OUTPATIENT
Start: 2019-01-10 | End: 2019-04-19

## 2019-01-10 RX ORDER — CITALOPRAM 20 MG/1
20 TABLET ORAL DAILY
Qty: 90 TAB | Refills: 1 | Status: SHIPPED | OUTPATIENT
Start: 2019-01-10 | End: 2019-07-20 | Stop reason: SDUPTHER

## 2019-01-10 ASSESSMENT — PATIENT HEALTH QUESTIONNAIRE - PHQ9
7. TROUBLE CONCENTRATING ON THINGS, SUCH AS READING THE NEWSPAPER OR WATCHING TELEVISION: NOT AT ALL
SUM OF ALL RESPONSES TO PHQ QUESTIONS 1-9: 0
3. TROUBLE FALLING OR STAYING ASLEEP OR SLEEPING TOO MUCH: NOT AT ALL
6. FEELING BAD ABOUT YOURSELF - OR THAT YOU ARE A FAILURE OR HAVE LET YOURSELF OR YOUR FAMILY DOWN: NOT AL ALL
4. FEELING TIRED OR HAVING LITTLE ENERGY: NOT AT ALL
9. THOUGHTS THAT YOU WOULD BE BETTER OFF DEAD, OR OF HURTING YOURSELF: NOT AT ALL
8. MOVING OR SPEAKING SO SLOWLY THAT OTHER PEOPLE COULD HAVE NOTICED. OR THE OPPOSITE, BEING SO FIGETY OR RESTLESS THAT YOU HAVE BEEN MOVING AROUND A LOT MORE THAN USUAL: NOT AT ALL
1. LITTLE INTEREST OR PLEASURE IN DOING THINGS: NOT AT ALL
5. POOR APPETITE OR OVEREATING: NOT AT ALL
2. FEELING DOWN, DEPRESSED, IRRITABLE, OR HOPELESS: NOT AT ALL
SUM OF ALL RESPONSES TO PHQ9 QUESTIONS 1 AND 2: 0

## 2019-01-10 NOTE — PATIENT INSTRUCTIONS
1. Exercise and Physical Activity  According to the American Heart Association, it is recommended to engage in physical activity regularly and to aim for 150 minutes of moderate-intensity aerobic activity per week.  Your Healthcare Provider may have recommended taking the stairs instead of the elevator, starting or maintaining a walking program or strength-training program.    2. Emotional Well-being  Mental and emotional well-being is essential to overall health.  Your Healthcare Provider may have encouraged you to build strong, positive relationships with family and friends, become more involved in your community (by volunteering or joining a spiritual community), or focus on self-care.    3. Fall and Injury Prevention  To prevent falls and injuries and also improve your balance, your Healthcare Provider may have suggested that you use a cane or walker, start an exercise of physical therapy program, or have your vision and/or hearing tested.    4. Urinary Leakage (Urinary Incontinence)  To control or manage the leakage of urine, your Healthcare Provider may have recommended you start bladder training exercises (such as Kegel exercises), a trial of a medication or a referral to see a specialist to discuss surgical options.      · For the cough, try flonase and antihistamine for post nasal drip and prilosec (over the counter acid blocker) 30 minutes before breakfast

## 2019-01-10 NOTE — ASSESSMENT & PLAN NOTE
Her grand daughter was exposed to latent TB and had a negative PPD. Diamante is requesting a PPD.

## 2019-01-10 NOTE — ASSESSMENT & PLAN NOTE
Says that her insulin is only lasting a little under a month and she goes 2-3 days without insulin.

## 2019-01-10 NOTE — PROGRESS NOTES
Annual Health Assessment Questions:    1.  Are you currently engaging in any exercise or physical activity? No    2.  How would you describe your mood or emotional well-being today? good    3.  Have you had any falls in the last year? No    4.  Have you noticed any problems with your balance or had difficulty walking? No    5.  In the last six months have you experienced any leakage of urine? No    6. DPA/Advanced Directive: Patient does not have an Advanced Directive.  A packet and workshop information was given on Advanced Directives.    PRIMARY CARE CLINIC FOLLOW UP VISIT  Chief Complaint   Patient presents with   • Diabetes     f/v   • Cough     2 months   • Hernia     History of Present Illness     Cough  Started having a cough about 2 months ago. In the last 3 weeks her cough has gotten worse. Has both post nasal drip and acid reflux. Mucinex is helping, has been able to cough dark brown phlegm up.     Hernia of abdominal wall  Her chronic cough resulted in a hernia she first noted 2 weeks ago. The hernia is more painful when she coughs or blows her nose. Pain is 10/10 when it hurts.     Exposure to TB  Her grand daughter was exposed to latent TB and had a negative PPD. Diamante is requesting a PPD.     Diabetes mellitus (CMS-HCC) (Formerly [facility])  Says that her insulin is only lasting a little under a month and she goes 2-3 days without insulin.     Current Outpatient Prescriptions   Medication Sig Dispense Refill   • insulin glargine (LANTUS) 100 UNIT/ML Solution INJECT 45 UNITS SUBCUTANEOUSLY ONCE DAILY AT BEDTIME AS DIRECTED 15 mL 2   • citalopram (CELEXA) 20 MG Tab Take 1 Tab by mouth every day. 90 Tab 1   • lisinopril (PRINIVIL) 20 MG Tab TAKE ONE TABLET BY MOUTH ONCE DAILY 90 Tab 0   • FREESTYLE LITE strip      • pravastatin (PRAVACHOL) 20 MG Tab Take 1 Tab by mouth every day. 90 Tab 1   • Blood Glucose Monitoring Suppl Supplies Misc Test strip and meter order: Test strips for [employer] or whichever brand is covered by  ins. Sig: use daily and prn ssx high or low sugar 100 Units 3   • Blood Glucose Monitoring Suppl Device Meter: Dispense Device of Insurance Preference. Sig. Use every morning to obtain fasting blood sugars. #1. NR. 1 Device 0   • Blood Glucose Monitoring Suppl Device Meter: Dispense Device of Insurance Preference. Sig. Use as directed for blood sugar monitoring. #1. NR. 1 Device 0   • Lancets Misc Lancets order: Lancets for insurance provided/covered meter. Sig: use daily and prn ssx high or low sugar. #100 RF x 3 100 Each 3   • Blood Glucose Monitoring Suppl (BLOOD GLUCOSE MONITOR KIT) KIT 1 Each by Does not apply route. To check blood sugars QD  Dx:  250.00  BEN:  99 1 Each 0   • aspirin (ASA) 81 MG CHEW Take 1 Tab by mouth every day. 20 Each 1     No current facility-administered medications for this visit.      Past Medical History:   Diagnosis Date   • Depression    • DIABETES MELLITUS 10/6/2011   • GERD (gastroesophageal reflux disease)    • HTN (hypertension) 3/8/2013   • Hyperlipidemia    • Shingles outbreak 2016    right groin with periodic flares     Past Surgical History:   Procedure Laterality Date   • ANGIOPLASTY     • ABDOMINAL HYSTERECTOMY TOTAL      has cervix and ovaries   • HEMORRHOIDECTOMY     • HEMORRHOIDECTOMY       Social History   Substance Use Topics   • Smoking status: Never Smoker   • Smokeless tobacco: Never Used   • Alcohol use No     Social History     Social History Narrative    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 Her  and a son and daughter of hers committed suicide.           Family History   Problem Relation Age of Onset   • Breast Cancer Mother 50        metastatic   • Alcohol/Drug Father    • Other Father         cirrhosis    • Diabetes Sister    • Other Daughter         suicide    • Other Son         suicide     Family Status   Relation Status   • Mo  at age 65   • Fa    • Sis Alive   • Tom (Not Specified)   • Son (Not  Specified)     Allergies: Atorvastatin and Codeine    ROS  As per HPI above. All other systems reviewed and negative.        Objective   Blood pressure 124/68, pulse 66, temperature 36.7 °C (98.1 °F), temperature source Temporal, resp. rate 12, height 1.524 m (5'), weight 86.7 kg (191 lb 3.2 oz), SpO2 98 %, not currently breastfeeding. Body mass index is 37.34 kg/m².    General: alert and oriented, pleasant, cooperative  HEENT: Normocephalic, atraumatic. Moist mucous membranes   Cardiovascular: regular rate and rhythm, normal S1/S2  Pulmonary: lungs clear to auscultation bilaterally  Gastrointestinal: no tenderness to palpation. No hepatosplenomegaly. Bowel sounds normoactive. Unable to palpate hernia even with coughing  Lymphatics: no cervical or supraclavicular lymphadenopathy   Skin: warm and dry, no lesions or rashes  Psychiatric: appropriate mood and affect. Good insight and appropriate judgment     Assessment and Plan   The following treatment plan was discussed     1. Need for vaccination  - Hepatitis B Vaccine Adult IM  - Pneumococal Polysaccharide Vaccine 23-Valent =>3YO SQ/IM    2. Postmenopausal  - DS-BONE DENSITY STUDY (DEXA); Standing  - DS-BONE DENSITY STUDY (DEXA)    3. Encounter for screening mammogram for breast cancer  - MA-SCREEN MAMMO W/CAD-BILAT; Standing  - MA-SCREEN MAMMO W/CAD-BILAT    4. Type 2 diabetes mellitus without complication, with long-term current use of insulin (HCC)  Due for repeat labs, ordered. Given enough mL of insulin to last at least 33 days, she will let us know if she runs short or there are issues with her prescription.   - Lipid Profile; Standing  - MICROALBUMIN CREAT RATIO URINE; Standing  - Diabetic Monofilament LE Exam  - COMP METABOLIC PANEL; Standing  - HEMOGLOBIN A1C; Future  - Lipid Profile  - MICROALBUMIN CREAT RATIO URINE  - insulin glargine (LANTUS) 100 UNIT/ML Solution; INJECT 45 UNITS SUBCUTANEOUSLY ONCE DAILY AT BEDTIME AS DIRECTED  Dispense: 15 mL; Refill:  2    6. Cough  Likely due to post nasal drip and acid reflux. Advised antihistamine, flonase, and acid blocker.     7. Hernia of abdominal wall  Unable to palpate hernia on exam even with having her cough, will check US.   - US-HERNIA ABDOMEN; Future    8. Exposure to TB  - PPD SKIN TEST    9. Colon cancer screening  - COLOGUARD (FIT DNA)    10. PTSD (post-traumatic stress disorder)  - citalopram (CELEXA) 20 MG Tab; Take 1 Tab by mouth every day.  Dispense: 90 Tab; Refill: 1      Healthcare maintenance     Health Maintenance Due   Topic Date Due   • Annual Wellness Visit  1950   • IMM HEP B VACCINE (1 of 3 - Risk 3-dose series) 02/20/1969   • COLONOSCOPY  02/20/2000   • BONE DENSITY  02/20/2015   • A1C SCREENING  07/05/2018   • MAMMOGRAM  09/12/2018   • IMM PNEUMOCOCCAL 65+ (ADULT) LOW/MEDIUM RISK SERIES (2 of 2 - PPSV23) 12/28/2018   • FASTING LIPID PROFILE  01/05/2019   • URINE ACR / MICROALBUMIN  01/05/2019   • SERUM CREATININE  01/05/2019       Return in about 3 months (around 4/10/2019), or if symptoms worsen or fail to improve.    Juan Gross MD  Internal Medicine  [facility]

## 2019-01-10 NOTE — ASSESSMENT & PLAN NOTE
Her chronic cough resulted in a hernia she first noted 2 weeks ago. The hernia is more painful when she coughs or blows her nose. Pain is 10/10 when it hurts.

## 2019-01-10 NOTE — ASSESSMENT & PLAN NOTE
Started having a cough about 2 months ago. In the last 3 weeks her cough has gotten worse. Has both post nasal drip and acid reflux. Mucinex is helping, has been able to cough dark brown phlegm up.

## 2019-01-11 ENCOUNTER — HOSPITAL ENCOUNTER (OUTPATIENT)
Dept: RADIOLOGY | Facility: MEDICAL CENTER | Age: 69
End: 2019-01-11
Attending: INTERNAL MEDICINE
Payer: MEDICARE

## 2019-01-11 ENCOUNTER — HOSPITAL ENCOUNTER (OUTPATIENT)
Dept: LAB | Facility: MEDICAL CENTER | Age: 69
End: 2019-01-11
Attending: INTERNAL MEDICINE
Payer: MEDICARE

## 2019-01-11 DIAGNOSIS — Z79.4 TYPE 2 DIABETES MELLITUS WITHOUT COMPLICATION, WITH LONG-TERM CURRENT USE OF INSULIN (HCC): ICD-10-CM

## 2019-01-11 DIAGNOSIS — E11.9 TYPE 2 DIABETES MELLITUS WITHOUT COMPLICATION, WITH LONG-TERM CURRENT USE OF INSULIN (HCC): ICD-10-CM

## 2019-01-11 DIAGNOSIS — K46.9 NON-RECURRENT ABDOMINAL HERNIA WITHOUT OBSTRUCTION OR GANGRENE, UNSPECIFIED HERNIA TYPE: ICD-10-CM

## 2019-01-11 DIAGNOSIS — K43.9 HERNIA OF ABDOMINAL WALL: ICD-10-CM

## 2019-01-11 LAB
EST. AVERAGE GLUCOSE BLD GHB EST-MCNC: 131 MG/DL
HBA1C MFR BLD: 6.2 % (ref 0–5.6)

## 2019-01-11 PROCEDURE — 36415 COLL VENOUS BLD VENIPUNCTURE: CPT

## 2019-01-11 PROCEDURE — 83036 HEMOGLOBIN GLYCOSYLATED A1C: CPT

## 2019-01-11 PROCEDURE — 76705 ECHO EXAM OF ABDOMEN: CPT

## 2019-01-12 ENCOUNTER — NON-PROVIDER VISIT (OUTPATIENT)
Dept: URGENT CARE | Facility: PHYSICIAN GROUP | Age: 69
End: 2019-01-12
Payer: MEDICARE

## 2019-01-12 LAB — TB WHEAL 3D P 5 TU DIAM: NORMAL MM

## 2019-01-14 ENCOUNTER — TELEPHONE (OUTPATIENT)
Dept: MEDICAL GROUP | Facility: PHYSICIAN GROUP | Age: 69
End: 2019-01-14

## 2019-01-14 NOTE — TELEPHONE ENCOUNTER
----- Message from Juan Gross M.D. sent at 1/11/2019  5:16 PM PST -----  Please ensure she receives [place] message

## 2019-01-14 NOTE — TELEPHONE ENCOUNTER
3rd attempt.  Phone was forwarded to voicemail.  Mailbox was full. Letter mailed out the the patient.

## 2019-01-14 NOTE — LETTER
January 14, 2019        Diamante Mcclain-June [address] [address]        Dear Diamante:    We have attempted to contact you multiple times.  Please give us a call in regards to your imaging results.      If you have any questions or concerns, please don't hesitate to call.        Sincerely,        Juan Gross M.D.    Electronically Signed

## 2019-02-08 DIAGNOSIS — I10 ESSENTIAL HYPERTENSION: ICD-10-CM

## 2019-02-08 DIAGNOSIS — R21 RASH: ICD-10-CM

## 2019-02-08 DIAGNOSIS — E78.5 HYPERLIPIDEMIA, UNSPECIFIED HYPERLIPIDEMIA TYPE: ICD-10-CM

## 2019-02-08 RX ORDER — PRAVASTATIN SODIUM 20 MG
TABLET ORAL
Qty: 90 TAB | Refills: 0 | Status: SHIPPED | OUTPATIENT
Start: 2019-02-08 | End: 2019-05-15 | Stop reason: SDUPTHER

## 2019-02-08 NOTE — TELEPHONE ENCOUNTER
Was the patient seen in the last year in this department? Yes    Does patient have an active prescription for medications requested? No     Received Request Via: Pharmacy      Pt met protocol?: Yes    OV 1/19       Lab Results  Component Value Date/Time   CHOLSTRLTOT 263 (H) 01/05/2018 0742   TRIGLYCERIDE 139 01/05/2018 0742   HDL 50 01/05/2018 0742    (H) 01/05/2018 0742

## 2019-02-11 ENCOUNTER — TELEPHONE (OUTPATIENT)
Dept: MEDICAL GROUP | Facility: PHYSICIAN GROUP | Age: 69
End: 2019-02-11

## 2019-02-11 ENCOUNTER — NON-PROVIDER VISIT (OUTPATIENT)
Dept: MEDICAL GROUP | Facility: PHYSICIAN GROUP | Age: 69
End: 2019-02-11
Payer: MEDICARE

## 2019-02-11 DIAGNOSIS — Z23 NEED FOR VACCINATION: ICD-10-CM

## 2019-02-11 PROCEDURE — G0010 ADMIN HEPATITIS B VACCINE: HCPCS | Performed by: INTERNAL MEDICINE

## 2019-02-11 PROCEDURE — 90746 HEPB VACCINE 3 DOSE ADULT IM: CPT | Performed by: INTERNAL MEDICINE

## 2019-02-11 NOTE — TELEPHONE ENCOUNTER
1. Caller Name:                                          Call Back Number:       Patient approves a detailed voicemail message: N\A    Patient is on the MA Schedule today for 2nd hep b vaccine/injection.    SPECIFIC Action To Be Taken: Orders pending, please sign.

## 2019-02-12 RX ORDER — OMEPRAZOLE 40 MG/1
40 CAPSULE, DELAYED RELEASE ORAL DAILY
Qty: 90 CAP | Refills: 0 | Status: SHIPPED | OUTPATIENT
Start: 2019-02-12 | End: 2019-10-30 | Stop reason: SDUPTHER

## 2019-02-12 NOTE — PROGRESS NOTES
"Diamante Mcclain-June is a 68 y.o. female here for a non-provider visit for:   HEPATITIS B 2 of 3    Reason for immunization: continue or complete series started at the office  Immunization records indicate need for vaccine: Yes, confirmed with Epic  Minimum interval has been met for this vaccine: Yes  ABN completed: No    Order and dose verified by: TG  VIS Dated   was given to patient: No  All IAC Questionnaire questions were answered \"No.\"    Patient tolerated injection and no adverse effects were observed or reported: Yes    Pt scheduled for next dose in series: No      "

## 2019-02-12 NOTE — TELEPHONE ENCOUNTER
1. Caller Name: Pt                       Call Back Number: [phone] (home)     2. Message: Pt was last seen for cough on 1/10/19; advised; Likely due to post nasal drip and acid reflux. Advised antihistamine, flonase, and acid blocker.   Pt is asking if she could be prescribed Omeprazole to start taking? States her current regimen is giving no reflief.   Please Advise     3. Patient approves office to leave a detailed voicemail/MyChart message: yes

## 2019-04-03 DIAGNOSIS — I10 ESSENTIAL HYPERTENSION: ICD-10-CM

## 2019-04-03 DIAGNOSIS — E11.9 TYPE 2 DIABETES MELLITUS WITHOUT COMPLICATION, WITH LONG-TERM CURRENT USE OF INSULIN (HCC): ICD-10-CM

## 2019-04-03 DIAGNOSIS — Z79.4 TYPE 2 DIABETES MELLITUS WITHOUT COMPLICATION, WITH LONG-TERM CURRENT USE OF INSULIN (HCC): ICD-10-CM

## 2019-04-04 RX ORDER — LISINOPRIL 20 MG/1
TABLET ORAL
Qty: 90 TAB | Refills: 0 | Status: SHIPPED | OUTPATIENT
Start: 2019-04-04 | End: 2019-04-05 | Stop reason: SDUPTHER

## 2019-04-04 NOTE — TELEPHONE ENCOUNTER
*PT NEEDS TO GET LABS UPDATED*  Was the patient seen in the last year in this department? Yes    Does patient have an active prescription for medications requested? No     Received Request Via: Pharmacy      Pt met protocol?: NO  LAST OV 01/10/2019    BP Readings from Last 1 Encounters:   01/10/19 124/68     Lab Results   Component Value Date/Time    CHOLSTRLTOT 263 (H) 01/05/2018 07:42 AM     (H) 01/05/2018 07:42 AM    HDL 50 01/05/2018 07:42 AM    TRIGLYCERIDE 139 01/05/2018 07:42 AM       Lab Results   Component Value Date/Time    SODIUM 139 01/05/2018 07:42 AM    POTASSIUM 4.2 01/05/2018 07:42 AM    CHLORIDE 104 01/05/2018 07:42 AM    CO2 27 01/05/2018 07:42 AM    GLUCOSE 142 (H) 01/05/2018 07:42 AM    BUN 15 01/05/2018 07:42 AM    CREATININE 0.64 01/05/2018 07:42 AM     Lab Results   Component Value Date/Time    ALKPHOSPHAT 76 01/05/2018 07:42 AM    ASTSGOT 15 01/05/2018 07:42 AM    ALTSGPT 16 01/05/2018 07:42 AM    TBILIRUBIN 0.4 01/05/2018 07:42 AM        Lab Results  Component Value Date/Time   HBA1C 6.2 (H) 01/11/2019 0851       Lab Results  Component Value Date/Time   AVGLUC 131 01/11/2019 0851       Lab Results  Component Value Date/Time   CHOLSTRLTOT 263 (H) 01/05/2018 0742       Lab Results  Component Value Date/Time   TRIGLYCERIDE 139 01/05/2018 0742     No components found for: HLD    Lab Results  Component Value Date/Time    (H) 01/05/2018 0742

## 2019-04-05 DIAGNOSIS — E11.9 TYPE 2 DIABETES MELLITUS WITHOUT COMPLICATION, WITH LONG-TERM CURRENT USE OF INSULIN (HCC): ICD-10-CM

## 2019-04-05 DIAGNOSIS — I10 ESSENTIAL HYPERTENSION: ICD-10-CM

## 2019-04-05 DIAGNOSIS — Z79.4 TYPE 2 DIABETES MELLITUS WITHOUT COMPLICATION, WITH LONG-TERM CURRENT USE OF INSULIN (HCC): ICD-10-CM

## 2019-04-05 NOTE — TELEPHONE ENCOUNTER
Was the patient seen in the last year in this department? Yes    Does patient have an active prescription for medications requested? No     Received Request Via: Pharmacy      Pt met protocol?: Yes    OV 1/19    BP Readings from Last 1 Encounters:   01/10/19 124/68     *100 DAYS SUPPLY

## 2019-04-09 RX ORDER — LISINOPRIL 20 MG/1
TABLET ORAL
Qty: 100 TAB | Refills: 0 | Status: SHIPPED | OUTPATIENT
Start: 2019-04-09

## 2019-04-19 DIAGNOSIS — Z79.4 TYPE 2 DIABETES MELLITUS WITHOUT COMPLICATION, WITH LONG-TERM CURRENT USE OF INSULIN (HCC): ICD-10-CM

## 2019-04-19 DIAGNOSIS — E11.9 TYPE 2 DIABETES MELLITUS WITHOUT COMPLICATION, WITH LONG-TERM CURRENT USE OF INSULIN (HCC): ICD-10-CM

## 2019-04-19 RX ORDER — INSULIN GLARGINE 100 [IU]/ML
INJECTION, SOLUTION SUBCUTANEOUS
Qty: 15 ML | Refills: 2 | Status: CANCELLED | OUTPATIENT
Start: 2019-04-19

## 2019-04-19 NOTE — TELEPHONE ENCOUNTER
Dr Gross- It looks like last time pt was in there was some confusion with how long the Lantus should last. The way it has been prescribed is for the Lantus vial which would only last the pt 22 days. If you wanted to prescribed the Lantus Solostar pen needles 1 box would last the pt 33 days. Please change or refill as you see fit.

## 2019-05-15 RX ORDER — PRAVASTATIN SODIUM 20 MG
TABLET ORAL
Qty: 100 TAB | Refills: 0 | Status: SHIPPED | OUTPATIENT
Start: 2019-05-15 | End: 2019-09-17 | Stop reason: SDUPTHER

## 2019-05-28 ENCOUNTER — TELEPHONE (OUTPATIENT)
Dept: MEDICAL GROUP | Facility: PHYSICIAN GROUP | Age: 69
End: 2019-05-28

## 2019-05-28 NOTE — TELEPHONE ENCOUNTER
Future Appointments       Provider Department Center    5/29/2019 11:15 AM Juan Gross M.D. [facility]    7/10/2019 4:15 PM [facility]        ESTABLISHED PATIENT PRE-VISIT PLANNING     Patient was NOT contacted to complete PVP.    1.  Reviewed notes from the last few office visits within the medical group: Yes    2.  If any orders were placed at last visit or intended to be done for this visit (i.e. 6 mos follow-up), do we have Results/Consult Notes?        •  Labs - Labs ordered, NOT completed. Patient advised to complete prior to next appointment.       •  Imaging - Imaging ordered, completed and results are in chart.       •  Referrals - Referral ordered, patient was seen and consult notes are in chart. Care Teams updated  NO.    3. Is this appointment scheduled as a Hospital Follow-Up? No    4.  Immunizations were updated in [email or web address] using WebIZ?: Yes       •  Web Iz Recommendations: HEPATITIS A , TD, VARICELLA (Chicken Pox)  and SHINGRIX (Shingles)    5.  Patient is due for the following Health Maintenance Topics:   Health Maintenance Due   Topic Date Due   • Annual Wellness Visit  1950   • COLONOSCOPY  02/20/2000   • IMM ZOSTER VACCINES (1 of 2) 02/20/2000   • BONE DENSITY  02/20/2015   • MAMMOGRAM  09/12/2018   • FASTING LIPID PROFILE  01/05/2019   • URINE ACR / MICROALBUMIN  01/05/2019   • SERUM CREATININE  01/05/2019   • RETINAL SCREENING  03/28/2019     6. Orders for overdue Health Maintenance topics pended in Pre-Charting? NO    7.  AHA (MDX) form printed for Provider? YES    8.  Patient was informed to arrive 15 min prior to their scheduled appointment and bring in their medication bottles.  LVM reminding pt of appt, to check in 15 minutes prior, that labs are still needed, and to schedule AWV.

## 2019-05-29 ENCOUNTER — OFFICE VISIT (OUTPATIENT)
Dept: MEDICAL GROUP | Facility: PHYSICIAN GROUP | Age: 69
End: 2019-05-29
Payer: MEDICARE

## 2019-05-29 VITALS
WEIGHT: 184 LBS | RESPIRATION RATE: 16 BRPM | TEMPERATURE: 98.1 F | HEIGHT: 60 IN | SYSTOLIC BLOOD PRESSURE: 124 MMHG | BODY MASS INDEX: 36.12 KG/M2 | OXYGEN SATURATION: 94 % | DIASTOLIC BLOOD PRESSURE: 66 MMHG | HEART RATE: 59 BPM

## 2019-05-29 DIAGNOSIS — Z79.4 TYPE 2 DIABETES MELLITUS WITHOUT COMPLICATION, WITH LONG-TERM CURRENT USE OF INSULIN (HCC): ICD-10-CM

## 2019-05-29 DIAGNOSIS — Z12.39 BREAST CANCER SCREENING: ICD-10-CM

## 2019-05-29 DIAGNOSIS — N64.4 BREAST PAIN: ICD-10-CM

## 2019-05-29 DIAGNOSIS — Z01.84 IMMUNITY STATUS TESTING: ICD-10-CM

## 2019-05-29 DIAGNOSIS — R05.9 COUGH: ICD-10-CM

## 2019-05-29 DIAGNOSIS — E11.9 TYPE 2 DIABETES MELLITUS WITHOUT COMPLICATION, WITH LONG-TERM CURRENT USE OF INSULIN (HCC): ICD-10-CM

## 2019-05-29 PROCEDURE — 99213 OFFICE O/P EST LOW 20 MIN: CPT | Performed by: INTERNAL MEDICINE

## 2019-05-29 NOTE — ASSESSMENT & PLAN NOTE
Started having a cough about a week ago. Family members have also been sick. Cough has been a little productive since this morning. Denies fevers, chills.

## 2019-05-29 NOTE — PROGRESS NOTES
Annual Health Assessment Questions:    1.  Are you currently engaging in any exercise or physical activity? No    2.  How would you describe your mood or emotional well-being today? good    3.  Have you had any falls in the last year? No    4.  Have you noticed any problems with your balance or had difficulty walking? Yes    5.  In the last six months have you experienced any leakage of urine? No    6. DPA/Advanced Directive: Patient does not have an Advanced Directive.  A packet and workshop information was given on Advanced Directives.     PRIMARY CARE CLINIC FOLLOW UP VISIT  Chief Complaint   Patient presents with   • Breast Pain     Lt side, underneath x 2 weeks    • Cough     x 1 week      History of Present Illness     Breast pain  Starting about 2 weeks ago she noted breast pain underneath her left breast. Pain was sharp and then would throb after the sharp pains. In the interim the pain has resolved. Denies nipple discharge. Symptoms were intermittent and lasted about a week but have since resolved.     Cough  Started having a cough about a week ago. Family members have also been sick. Cough has been a little productive since this morning. Denies fevers, chills.     Current Outpatient Prescriptions   Medication Sig Dispense Refill   • pravastatin (PRAVACHOL) 20 MG Tab TAKE 1 TABLET BY MOUTH ONCE DAILY 100 Tab 0   • insulin glargine (LANTUS) 100 UNIT/ML Solution Pen-injector injection Inject 45 Units as instructed every evening. 5 PEN 3   • lisinopril (PRINIVIL) 20 MG Tab TAKE 1 TABLET BY MOUTH ONCE DAILY 100 Tab 0   • omeprazole (PRILOSEC) 40 MG delayed-release capsule Take 1 Cap by mouth every day. 90 Cap 0   • citalopram (CELEXA) 20 MG Tab Take 1 Tab by mouth every day. 90 Tab 1   • FREESTYLE LITE strip      • Blood Glucose Monitoring Suppl Supplies Misc Test strip and meter order: Test strips for [employer] or whichever brand is covered by ins. Sig: use daily and prn ssx high or low sugar 100 Units 3   •  Blood Glucose Monitoring Suppl Device Meter: Dispense Device of Insurance Preference. Sig. Use every morning to obtain fasting blood sugars. #1. NR. 1 Device 0   • Blood Glucose Monitoring Suppl Device Meter: Dispense Device of Insurance Preference. Sig. Use as directed for blood sugar monitoring. #1. NR. 1 Device 0   • Lancets Misc Lancets order: Lancets for insurance provided/covered meter. Sig: use daily and prn ssx high or low sugar. #100 RF x 3 100 Each 3   • Blood Glucose Monitoring Suppl (BLOOD GLUCOSE MONITOR KIT) KIT 1 Each by Does not apply route. To check blood sugars QD  Dx:  250.00  BEN:  99 1 Each 0   • aspirin (ASA) 81 MG CHEW Take 1 Tab by mouth every day. 20 Each 1     No current facility-administered medications for this visit.      Past Medical History:   Diagnosis Date   • Depression    • DIABETES MELLITUS 10/6/2011   • GERD (gastroesophageal reflux disease)    • HTN (hypertension) 3/8/2013   • Hyperlipidemia    • Shingles outbreak 2016    right groin with periodic flares     Past Surgical History:   Procedure Laterality Date   • ANGIOPLASTY     • ABDOMINAL HYSTERECTOMY TOTAL      has cervix and ovaries   • HEMORRHOIDECTOMY     • HEMORRHOIDECTOMY       Social History   Substance Use Topics   • Smoking status: Never Smoker   • Smokeless tobacco: Never Used   • Alcohol use No     Social History     Social History Narrative    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 Her  and a son and daughter of hers committed suicide.           Family History   Problem Relation Age of Onset   • Breast Cancer Mother 50        metastatic   • Alcohol/Drug Father    • Other Father         cirrhosis    • Diabetes Sister    • Other Daughter         suicide    • Other Son         suicide     Family Status   Relation Status   • Mo  at age 65   • Fa    • Sis Alive   • Tom (Not Specified)   • Son (Not Specified)     Allergies: Atorvastatin and Codeine    ROS  As per HPI  above. All other systems reviewed and negative.        Objective   /66 (BP Cuff Size: Large adult)   Pulse (!) 59   Temp 36.7 °C (98.1 °F)   Resp 16   Ht 1.524 m (5')   Wt 83.5 kg (184 lb)   SpO2 94%  Body mass index is 35.94 kg/m².    General: alert and oriented, pleasant, cooperative  HEENT: Normocephalic, atraumatic.   Pulmonary: clear to auscultation bilaterally   Breast: no bilateral axillary lymphadenopathy, no skin dimpling or masses of breasts palpated. No nipple discharge   Psychiatric: appropriate mood and affect. Good insight and appropriate judgment     Assessment and Plan   The following treatment plan was discussed     1. Breast pain  No abnormalities noted on exam, however, due for mammogram. Was likely musculoskeletal and now symptoms have resolved.     2. Breast cancer screening  - MA-SCREENING MAMMO BILAT W/TOMOSYNTHESIS W/CAD; Future    3. Cough  Likely viral URI, counseled on supportive care.     4. Immunity status testing  - HEPATITIS C RNA QUANT.; Future    5. Type 2 diabetes mellitus without complication, with long-term current use of insulin (HCC)  - Comp Metabolic Panel; Future  - CBC WITH DIFFERENTIAL; Future  - Lipid Profile; Future  - MICROALBUMIN CREAT RATIO URINE; Future      Healthcare maintenance     Health Maintenance Due   Topic Date Due   • Annual Wellness Visit  1950   • HEPATITIS C SCREENING  1950   • COLONOSCOPY  02/20/2000   • BONE DENSITY  02/20/2015   • MAMMOGRAM  09/12/2018   • FASTING LIPID PROFILE  01/05/2019   • URINE ACR / MICROALBUMIN  01/05/2019   • SERUM CREATININE  01/05/2019   • RETINAL SCREENING  03/28/2019       Return if symptoms worsen or fail to improve.    Juan Gross MD  Internal Medicine  [facility]

## 2019-05-29 NOTE — ASSESSMENT & PLAN NOTE
Starting about 2 weeks ago she noted breast pain underneath her left breast. Pain was sharp and then would throb after the sharp pains. In the interim the pain has resolved. Denies nipple discharge. Symptoms were intermittent and lasted about a week but have since resolved.

## 2019-06-10 ENCOUNTER — OFFICE VISIT (OUTPATIENT)
Dept: URGENT CARE | Facility: PHYSICIAN GROUP | Age: 69
End: 2019-06-10
Payer: MEDICARE

## 2019-06-10 VITALS
OXYGEN SATURATION: 92 % | HEIGHT: 61 IN | SYSTOLIC BLOOD PRESSURE: 126 MMHG | RESPIRATION RATE: 14 BRPM | DIASTOLIC BLOOD PRESSURE: 64 MMHG | BODY MASS INDEX: 35.12 KG/M2 | TEMPERATURE: 97.1 F | WEIGHT: 186 LBS | HEART RATE: 68 BPM

## 2019-06-10 DIAGNOSIS — J22 LRTI (LOWER RESPIRATORY TRACT INFECTION): ICD-10-CM

## 2019-06-10 PROCEDURE — 99214 OFFICE O/P EST MOD 30 MIN: CPT | Performed by: FAMILY MEDICINE

## 2019-06-10 RX ORDER — FLUCONAZOLE 150 MG/1
150 TABLET ORAL DAILY
Qty: 1 TAB | Refills: 0 | Status: SHIPPED | OUTPATIENT
Start: 2019-06-10

## 2019-06-10 RX ORDER — BENZONATATE 100 MG/1
100 CAPSULE ORAL 3 TIMES DAILY PRN
Qty: 60 CAP | Refills: 0 | Status: SHIPPED | OUTPATIENT
Start: 2019-06-10

## 2019-06-10 RX ORDER — AZITHROMYCIN 250 MG/1
TABLET, FILM COATED ORAL
Qty: 6 TAB | Refills: 0 | Status: SHIPPED | OUTPATIENT
Start: 2019-06-10

## 2019-06-10 ASSESSMENT — ENCOUNTER SYMPTOMS
FEVER: 0
COUGH: 1
WHEEZING: 1
SHORTNESS OF BREATH: 1

## 2019-06-10 NOTE — PATIENT INSTRUCTIONS

## 2019-06-10 NOTE — PROGRESS NOTES
"Subjective:   Diamante Del Valle is a 69 y.o. female who presents for Cough (cough x3 wks, some SOB)        Cough   This is a new problem. The current episode started 1 to 4 weeks ago. The problem has been gradually worsening. The problem occurs every few minutes. The cough is non-productive. Associated symptoms include shortness of breath and wheezing. Pertinent negatives include no fever or nasal congestion.     Review of Systems   Constitutional: Negative for fever.   Respiratory: Positive for cough, shortness of breath and wheezing.      Allergies   Allergen Reactions   • Atorvastatin Rash     Rash on hands    • Codeine       Objective:   /64 (BP Location: Right arm, Patient Position: Sitting, BP Cuff Size: Small adult)   Pulse 68   Temp 36.2 °C (97.1 °F) (Temporal)   Resp 14   Ht 1.549 m (5' 1\")   Wt 84.4 kg (186 lb)   SpO2 92%   BMI 35.14 kg/m²   Physical Exam   Constitutional: She is oriented to person, place, and time. She appears well-developed and well-nourished. No distress.   HENT:   Head: Normocephalic and atraumatic.   Eyes: Pupils are equal, round, and reactive to light. Conjunctivae and EOM are normal.   Cardiovascular: Normal rate, regular rhythm, normal heart sounds and intact distal pulses.    No murmur heard.  Pulmonary/Chest: Effort normal. No respiratory distress. She has wheezes. She has no rales.   Abdominal: Soft. Bowel sounds are normal. She exhibits no distension. There is no tenderness.   Musculoskeletal: Normal range of motion.   Neurological: She is alert and oriented to person, place, and time. She has normal reflexes. No sensory deficit.   Skin: Skin is warm and dry.   Psychiatric: She has a normal mood and affect. Her behavior is normal.         Assessment/Plan:   1. LRTI (lower respiratory tract infection)  - azithromycin (ZITHROMAX) 250 MG Tab; Take 2 tablets by mouth on day one. Take one tablet by mouth the remaining days until gone  Dispense: 6 Tab; Refill: 0  - " benzonatate (TESSALON) 100 MG Cap; Take 1 Cap by mouth 3 times a day as needed for Cough.  Dispense: 60 Cap; Refill: 0  - fluconazole (DIFLUCAN) 150 MG tablet; Take 1 Tab by mouth every day.  Dispense: 1 Tab; Refill: 0    Differential diagnosis, natural history, supportive care, and indications for immediate follow-up discussed.

## 2019-06-20 ENCOUNTER — HOSPITAL ENCOUNTER (OUTPATIENT)
Dept: RADIOLOGY | Facility: MEDICAL CENTER | Age: 69
End: 2019-06-20

## 2019-06-20 ENCOUNTER — HOSPITAL ENCOUNTER (OUTPATIENT)
Dept: LAB | Facility: MEDICAL CENTER | Age: 69
End: 2019-06-20
Attending: INTERNAL MEDICINE
Payer: MEDICARE

## 2019-06-20 DIAGNOSIS — E11.9 TYPE 2 DIABETES MELLITUS WITHOUT COMPLICATION, WITH LONG-TERM CURRENT USE OF INSULIN (HCC): ICD-10-CM

## 2019-06-20 DIAGNOSIS — Z01.84 IMMUNITY STATUS TESTING: ICD-10-CM

## 2019-06-20 DIAGNOSIS — Z79.4 TYPE 2 DIABETES MELLITUS WITHOUT COMPLICATION, WITH LONG-TERM CURRENT USE OF INSULIN (HCC): ICD-10-CM

## 2019-06-20 LAB
ALBUMIN SERPL BCP-MCNC: 3.8 G/DL (ref 3.2–4.9)
ALBUMIN/GLOB SERPL: 1.1 G/DL
ALP SERPL-CCNC: 98 U/L (ref 30–99)
ALT SERPL-CCNC: 21 U/L (ref 2–50)
ANION GAP SERPL CALC-SCNC: 8 MMOL/L (ref 0–11.9)
AST SERPL-CCNC: 16 U/L (ref 12–45)
BASOPHILS # BLD AUTO: 0.6 % (ref 0–1.8)
BASOPHILS # BLD: 0.06 K/UL (ref 0–0.12)
BILIRUB SERPL-MCNC: 0.4 MG/DL (ref 0.1–1.5)
BUN SERPL-MCNC: 11 MG/DL (ref 8–22)
CALCIUM SERPL-MCNC: 9.3 MG/DL (ref 8.5–10.5)
CHLORIDE SERPL-SCNC: 106 MMOL/L (ref 96–112)
CHOLEST SERPL-MCNC: 174 MG/DL (ref 100–199)
CO2 SERPL-SCNC: 28 MMOL/L (ref 20–33)
CREAT SERPL-MCNC: 0.63 MG/DL (ref 0.5–1.4)
CREAT UR-MCNC: 130.3 MG/DL
EOSINOPHIL # BLD AUTO: 0.17 K/UL (ref 0–0.51)
EOSINOPHIL NFR BLD: 1.8 % (ref 0–6.9)
ERYTHROCYTE [DISTWIDTH] IN BLOOD BY AUTOMATED COUNT: 46.6 FL (ref 35.9–50)
FASTING STATUS PATIENT QL REPORTED: NORMAL
GLOBULIN SER CALC-MCNC: 3.4 G/DL (ref 1.9–3.5)
GLUCOSE SERPL-MCNC: 61 MG/DL (ref 65–99)
HCT VFR BLD AUTO: 40.7 % (ref 37–47)
HDLC SERPL-MCNC: 49 MG/DL
HGB BLD-MCNC: 12.7 G/DL (ref 12–16)
IMM GRANULOCYTES # BLD AUTO: 0.03 K/UL (ref 0–0.11)
IMM GRANULOCYTES NFR BLD AUTO: 0.3 % (ref 0–0.9)
LDLC SERPL CALC-MCNC: 107 MG/DL
LYMPHOCYTES # BLD AUTO: 1.95 K/UL (ref 1–4.8)
LYMPHOCYTES NFR BLD: 20.9 % (ref 22–41)
MCH RBC QN AUTO: 26.8 PG (ref 27–33)
MCHC RBC AUTO-ENTMCNC: 31.2 G/DL (ref 33.6–35)
MCV RBC AUTO: 85.9 FL (ref 81.4–97.8)
MICROALBUMIN UR-MCNC: 0.7 MG/DL
MICROALBUMIN/CREAT UR: 5 MG/G (ref 0–30)
MONOCYTES # BLD AUTO: 0.65 K/UL (ref 0–0.85)
MONOCYTES NFR BLD AUTO: 7 % (ref 0–13.4)
NEUTROPHILS # BLD AUTO: 6.46 K/UL (ref 2–7.15)
NEUTROPHILS NFR BLD: 69.4 % (ref 44–72)
NRBC # BLD AUTO: 0 K/UL
NRBC BLD-RTO: 0 /100 WBC
PLATELET # BLD AUTO: 354 K/UL (ref 164–446)
PMV BLD AUTO: 9.5 FL (ref 9–12.9)
POTASSIUM SERPL-SCNC: 3.8 MMOL/L (ref 3.6–5.5)
PROT SERPL-MCNC: 7.2 G/DL (ref 6–8.2)
RBC # BLD AUTO: 4.74 M/UL (ref 4.2–5.4)
SODIUM SERPL-SCNC: 142 MMOL/L (ref 135–145)
TRIGL SERPL-MCNC: 90 MG/DL (ref 0–149)
WBC # BLD AUTO: 9.3 K/UL (ref 4.8–10.8)

## 2019-06-20 PROCEDURE — 82043 UR ALBUMIN QUANTITATIVE: CPT

## 2019-06-20 PROCEDURE — 80061 LIPID PANEL: CPT

## 2019-06-20 PROCEDURE — 87522 HEPATITIS C REVRS TRNSCRPJ: CPT

## 2019-06-20 PROCEDURE — 80053 COMPREHEN METABOLIC PANEL: CPT

## 2019-06-20 PROCEDURE — 82570 ASSAY OF URINE CREATININE: CPT

## 2019-06-20 PROCEDURE — 36415 COLL VENOUS BLD VENIPUNCTURE: CPT

## 2019-06-20 PROCEDURE — 85025 COMPLETE CBC W/AUTO DIFF WBC: CPT

## 2019-06-23 LAB
HCV RNA SERPL NAA+PROBE-ACNC: NOT DETECTED IU/ML
HCV RNA SERPL NAA+PROBE-LOG IU: NOT DETECTED LOG IU/ML
HCV RNA SERPL QL NAA+PROBE: NOT DETECTED

## 2019-06-27 ENCOUNTER — HOSPITAL ENCOUNTER (OUTPATIENT)
Dept: RADIOLOGY | Facility: MEDICAL CENTER | Age: 69
End: 2019-06-27
Attending: INTERNAL MEDICINE
Payer: MEDICARE

## 2019-06-27 DIAGNOSIS — Z12.39 BREAST CANCER SCREENING: ICD-10-CM

## 2019-06-27 PROCEDURE — 77063 BREAST TOMOSYNTHESIS BI: CPT

## 2019-07-09 ENCOUNTER — TELEPHONE (OUTPATIENT)
Dept: MEDICAL GROUP | Facility: PHYSICIAN GROUP | Age: 69
End: 2019-07-09

## 2019-07-09 DIAGNOSIS — Z23 NEED FOR VACCINATION: ICD-10-CM

## 2019-07-09 NOTE — TELEPHONE ENCOUNTER
1. Caller Name:                                          Call Back Number:       Patient approves a detailed voicemail message: N\A    Patient is on the MA Schedule tomorrow for 3rd hep b vaccine/injection.    SPECIFIC Action To Be Taken: Orders pending, please sign.

## 2019-07-10 ENCOUNTER — NON-PROVIDER VISIT (OUTPATIENT)
Dept: MEDICAL GROUP | Facility: PHYSICIAN GROUP | Age: 69
End: 2019-07-10
Payer: MEDICARE

## 2019-07-10 DIAGNOSIS — Z23 NEED FOR VACCINATION: ICD-10-CM

## 2019-07-10 PROCEDURE — G0010 ADMIN HEPATITIS B VACCINE: HCPCS | Performed by: INTERNAL MEDICINE

## 2019-07-10 PROCEDURE — 90746 HEPB VACCINE 3 DOSE ADULT IM: CPT | Performed by: INTERNAL MEDICINE

## 2019-07-10 NOTE — PROGRESS NOTES
"Diamante Del Valle is a 69 y.o. female here for a non-provider visit for:   HEPATITIS B 3 of 3    Reason for immunization: continue or complete series started at the office  Immunization records indicate need for vaccine: Yes, confirmed with Epic and confirmed with [place] WebIZ  Minimum interval has been met for this vaccine: Yes  ABN completed: Not Indicated    Order and dose verified by: DOMONIQUE  VIS Dated  10/12/18 was given to patient: Yes  All IAC Questionnaire questions were answered \"No.\"    Patient tolerated injection and no adverse effects were observed or reported: Yes    Pt scheduled for next dose in series: Not Indicated    "

## 2019-07-20 DIAGNOSIS — F43.10 PTSD (POST-TRAUMATIC STRESS DISORDER): ICD-10-CM

## 2019-07-22 RX ORDER — CITALOPRAM 20 MG/1
TABLET ORAL
Qty: 90 TAB | Refills: 1 | Status: SHIPPED | OUTPATIENT
Start: 2019-07-22

## 2019-08-05 ENCOUNTER — TELEPHONE (OUTPATIENT)
Dept: MEDICAL GROUP | Facility: PHYSICIAN GROUP | Age: 69
End: 2019-08-05

## 2019-08-05 NOTE — TELEPHONE ENCOUNTER
1. Caller Name: Diamante                                           Call Back Number: [phone] (home)         Patient approves a detailed voicemail message: yes    LVM for pt to schedule AWV.

## 2019-09-17 RX ORDER — PRAVASTATIN SODIUM 20 MG
TABLET ORAL
Qty: 100 TAB | Refills: 2 | Status: SHIPPED | OUTPATIENT
Start: 2019-09-17

## 2019-09-17 NOTE — TELEPHONE ENCOUNTER
Was the patient seen in the last year in this department? Yes    Does patient have an active prescription for medications requested? No     Received Request Via: Pharmacy      Pt met protocol?: Yes, last ov 5/19  Lab Results   Component Value Date/Time    CHOLSTRLTOT 174 06/20/2019 0938    TRIGLYCERIDE 90 06/20/2019 0938    HDL 49 06/20/2019 0938     (H) 06/20/2019 0938

## 2019-09-17 NOTE — TELEPHONE ENCOUNTER
Pt has had OV within the 12 month protocol and lipid panel is current. 9 month supply sent to pharmacy.   Lab Results   Component Value Date/Time    CHOLSTRLTOT 174 06/20/2019 09:38 AM     (H) 06/20/2019 09:38 AM    HDL 49 06/20/2019 09:38 AM    TRIGLYCERIDE 90 06/20/2019 09:38 AM       Lab Results   Component Value Date/Time    SODIUM 142 06/20/2019 09:38 AM    POTASSIUM 3.8 06/20/2019 09:38 AM    CHLORIDE 106 06/20/2019 09:38 AM    CO2 28 06/20/2019 09:38 AM    GLUCOSE 61 (L) 06/20/2019 09:38 AM    BUN 11 06/20/2019 09:38 AM    CREATININE 0.63 06/20/2019 09:38 AM     Lab Results   Component Value Date/Time    ALKPHOSPHAT 98 06/20/2019 09:38 AM    ASTSGOT 16 06/20/2019 09:38 AM    ALTSGPT 21 06/20/2019 09:38 AM    TBILIRUBIN 0.4 06/20/2019 09:38 AM

## 2019-10-29 ENCOUNTER — TELEPHONE (OUTPATIENT)
Dept: MEDICAL GROUP | Facility: PHYSICIAN GROUP | Age: 69
End: 2019-10-29

## 2019-10-29 NOTE — TELEPHONE ENCOUNTER
Future Appointments       Provider Department Center    11/1/2019 3:30 PM Romie Crabtree M.D. [facility]        ESTABLISHED PATIENT PRE-VISIT PLANNING     Patient was NOT contacted to complete PVP.       1.  Reviewed notes from the last few office visits within the medical group: Yes    2.  If any orders were placed at last visit or intended to be done for this visit (i.e. 6 mos follow-up), do we have Results/Consult Notes?        •  Labs - Labs ordered, completed on 06/20/2019 and results are in chart.       •  Imaging - Imaging ordered, completed and results are in chart.       •  Referrals - No referrals were ordered at last office visit.    3. Is this appointment scheduled as a Hospital Follow-Up? No    4.  Immunizations were updated in [employer] using WebIZ?: Yes       •  Web Iz Recommendations: FLU, HEPATITIS A , TD, VARICELLA (Chicken Pox)  and SHINGRIX (Shingles)    5.  Patient is due for the following Health Maintenance Topics:   Health Maintenance Due   Topic Date Due   • Annual Wellness Visit  1950   • HEPATITIS C SCREENING  1950   • COLONOSCOPY  02/20/2000   • BONE DENSITY  02/20/2015   • RETINAL SCREENING  03/28/2019   • A1C SCREENING  07/11/2019   • IMM INFLUENZA (1) 09/01/2019     6. Orders for overdue Health Maintenance topics pended in Pre-Charting? NO    7.  AHA (MDX) form printed for Provider? YES    8.  Patient was NOT informed to arrive 15 min prior to their scheduled appointment and bring in their medication bottles.

## 2019-10-31 RX ORDER — OMEPRAZOLE 40 MG/1
CAPSULE, DELAYED RELEASE ORAL
Qty: 90 CAP | Refills: 1 | Status: SHIPPED | OUTPATIENT
Start: 2019-10-31

## 2019-10-31 NOTE — TELEPHONE ENCOUNTER
Was the patient seen in the last year in this department? Yes    Does patient have an active prescription for medications requested? No     Received Request Via: Pharmacy      Pt met protocol?: Yes, last ov 5/19  Last labs 6/19

## 2019-12-05 ENCOUNTER — TELEPHONE (OUTPATIENT)
Dept: MEDICAL GROUP | Facility: PHYSICIAN GROUP | Age: 69
End: 2019-12-05

## 2019-12-05 NOTE — TELEPHONE ENCOUNTER
Medication refill denied for insulin.  I have never seen this patient before nor does she have a follow-up to establish care with any new doctor.  Please call the patient and see if she has any more Lantus insulin left and please ask her to establish care with a new doctor and unfortunately I am not taking any more new patients so please call the patient and find out if she has established with a new doctor and please ask her to establish with a new doctor ASAP and please find out if she needs her insulin Lantus and if so we could refill for short time to cover her for 3 months until she establishes care with a new doctor but she will need to establish care with a new doctor to make sure that she is taking the appropriate amount of medications and to recheck her diabetes.  Her last A1c was in January and her A1c was good at 6.2 which was really low so she may not need Lantus we do not want to give too much of the medication and have her get hypoglycemic so please explain this to the patient and ask her to establish care with a new provider.

## 2019-12-05 NOTE — TELEPHONE ENCOUNTER
VOICEMAIL  1. Caller Name: Diamante TAYLOR June                        Call Back Number: [phone] (home)       2. Message: pt states her co-pay for Lantus increased to $100 which she cannot afford every month.  She is asking for generic of if she can get the vials    3. Patient approves office to leave a detailed voicemail/MyChart message: N\A

## 2019-12-06 NOTE — TELEPHONE ENCOUNTER
I have never seen this patient and she was last seen for her diabetes in January and her last A1c was in January was 6.2 which was very low and so she needs to do lab work and A1c again and to be seen again as she may be injecting too much Lantus.  Since she has not been seen for a while and has not had an A1c done since January and it was low we need to recheck to see what dose of Lantus she needs to be on if any and also to go over her fasting sugar logs to bring in her fasting glucose logs.  So please asked the patient to establish care with a provider so that way we could give the appropriate medication and so please ask her to establish care with a new provider to discuss this.  Please find out how much Lantus she has and if she is running out of her Lantus but if she has enough then she will need to make an appointment with a new provider to make sure that she is injecting the proper amount as its been a while since she has done lab work. Thanks.

## 2019-12-07 NOTE — TELEPHONE ENCOUNTER
Patient was contacted and notified.  She was transferred over to scheduling to schedule an appointment.

## 2021-01-15 DIAGNOSIS — Z23 NEED FOR VACCINATION: ICD-10-CM
